# Patient Record
Sex: MALE | Race: WHITE | HISPANIC OR LATINO | Employment: OTHER | URBAN - METROPOLITAN AREA
[De-identification: names, ages, dates, MRNs, and addresses within clinical notes are randomized per-mention and may not be internally consistent; named-entity substitution may affect disease eponyms.]

---

## 2020-04-28 NOTE — PATIENT DISCUSSION
1.  PVD OS: Patient was cautioned to call our office immediately if they experience a substantial change in their symptoms such as an increase in floaters persistent flashes loss of visual field (may appear as a shadow or a curtain) or decrease in visual acuity as these may indicate a retinal tear or detachment. If this is a new problem patient will need to return for re-examination  as determined by the [address] [facility]. Refractive error - Glasses change optional. 3.  Return for an appointment in 1 year for comprehensive exam. with Dr. Chevy Oconnor.

## 2020-05-15 ENCOUNTER — NEW REFERRAL (OUTPATIENT)
Dept: URBAN - METROPOLITAN AREA CLINIC 26 | Facility: CLINIC | Age: 70
End: 2020-05-15

## 2020-05-15 VITALS
HEART RATE: 80 BPM | BODY MASS INDEX: 35.68 KG/M2 | SYSTOLIC BLOOD PRESSURE: 125 MMHG | HEIGHT: 66 IN | WEIGHT: 222 LBS | DIASTOLIC BLOOD PRESSURE: 78 MMHG

## 2020-05-15 DIAGNOSIS — H35.372: ICD-10-CM

## 2020-05-15 DIAGNOSIS — H43.393: ICD-10-CM

## 2020-05-15 DIAGNOSIS — H31.092: ICD-10-CM

## 2020-05-15 DIAGNOSIS — D31.31: ICD-10-CM

## 2020-05-15 DIAGNOSIS — H35.3131: ICD-10-CM

## 2020-05-15 DIAGNOSIS — H47.092: ICD-10-CM

## 2020-05-15 DIAGNOSIS — H43.813: ICD-10-CM

## 2020-05-15 PROCEDURE — 92250 FUNDUS PHOTOGRAPHY W/I&R: CPT

## 2020-05-15 PROCEDURE — 92004 COMPRE OPH EXAM NEW PT 1/>: CPT

## 2020-05-15 ASSESSMENT — VISUAL ACUITY
OS_CC: 20/30
OS_SC: 20/30
OD_SC: 20/25-2
OD_CC: 20/25

## 2020-05-15 ASSESSMENT — TONOMETRY
OS_IOP_MMHG: 15
OD_IOP_MMHG: 14

## 2020-09-18 ENCOUNTER — FOLLOW UP (OUTPATIENT)
Dept: URBAN - METROPOLITAN AREA CLINIC 26 | Facility: CLINIC | Age: 70
End: 2020-09-18

## 2020-09-18 DIAGNOSIS — D31.31: ICD-10-CM

## 2020-09-18 DIAGNOSIS — H31.092: ICD-10-CM

## 2020-09-18 DIAGNOSIS — H43.393: ICD-10-CM

## 2020-09-18 DIAGNOSIS — H43.813: ICD-10-CM

## 2020-09-18 DIAGNOSIS — H35.372: ICD-10-CM

## 2020-09-18 DIAGNOSIS — H35.3131: ICD-10-CM

## 2020-09-18 DIAGNOSIS — H47.092: ICD-10-CM

## 2020-09-18 PROCEDURE — 92250 FUNDUS PHOTOGRAPHY W/I&R: CPT

## 2020-09-18 PROCEDURE — 99214 OFFICE O/P EST MOD 30 MIN: CPT

## 2020-09-18 ASSESSMENT — VISUAL ACUITY
OD_CC: 20/25+1
OS_PH: 20/30-1
OS_CC: 20/40-2

## 2020-09-18 ASSESSMENT — TONOMETRY
OS_IOP_MMHG: 12
OD_IOP_MMHG: 13

## 2021-03-19 ENCOUNTER — FOLLOW UP (OUTPATIENT)
Dept: URBAN - METROPOLITAN AREA CLINIC 26 | Facility: CLINIC | Age: 71
End: 2021-03-19

## 2021-03-19 VITALS — BODY MASS INDEX: 35.68 KG/M2 | HEIGHT: 66 IN | WEIGHT: 222 LBS

## 2021-03-19 DIAGNOSIS — H43.393: ICD-10-CM

## 2021-03-19 DIAGNOSIS — H47.092: ICD-10-CM

## 2021-03-19 DIAGNOSIS — H43.813: ICD-10-CM

## 2021-03-19 DIAGNOSIS — H35.372: ICD-10-CM

## 2021-03-19 DIAGNOSIS — H31.092: ICD-10-CM

## 2021-03-19 DIAGNOSIS — D31.31: ICD-10-CM

## 2021-03-19 DIAGNOSIS — H35.3131: ICD-10-CM

## 2021-03-19 PROCEDURE — 92250 FUNDUS PHOTOGRAPHY W/I&R: CPT

## 2021-03-19 PROCEDURE — 92014 COMPRE OPH EXAM EST PT 1/>: CPT

## 2021-03-19 PROCEDURE — 92134 CPTRZ OPH DX IMG PST SGM RTA: CPT

## 2021-03-19 ASSESSMENT — TONOMETRY
OS_IOP_MMHG: 12
OD_IOP_MMHG: 10

## 2021-03-19 ASSESSMENT — VISUAL ACUITY
OD_SC: 20/20-2
OS_SC: 20/30
OS_CC: 20/30
OD_CC: 20/50+2

## 2021-04-28 NOTE — PATIENT DISCUSSION
1.  PVD OU:  Patient was cautioned to call our office immediately if they experience a substantial change in their symptoms such as an increase in floaters persistent flashes loss of visual field (may appear as a shadow or a curtain) or decrease in visual acuity as these may indicate a retinal tear or detachment. If this is a new problem patient will need to return for re-examination  as determined by the [address] [facility]. Refractive error - Glasses change optional. 3.  Return for an appointment in 1 year for comprehensive exam. with Dr. Rachel Richter.

## 2021-09-17 ENCOUNTER — FOLLOW UP (OUTPATIENT)
Dept: URBAN - METROPOLITAN AREA CLINIC 26 | Facility: CLINIC | Age: 71
End: 2021-09-17

## 2021-09-17 DIAGNOSIS — H35.372: ICD-10-CM

## 2021-09-17 DIAGNOSIS — H04.123: ICD-10-CM

## 2021-09-17 DIAGNOSIS — H31.092: ICD-10-CM

## 2021-09-17 DIAGNOSIS — D31.31: ICD-10-CM

## 2021-09-17 DIAGNOSIS — H43.393: ICD-10-CM

## 2021-09-17 DIAGNOSIS — H47.092: ICD-10-CM

## 2021-09-17 DIAGNOSIS — H35.3131: ICD-10-CM

## 2021-09-17 DIAGNOSIS — H33.102: ICD-10-CM

## 2021-09-17 DIAGNOSIS — H43.813: ICD-10-CM

## 2021-09-17 PROCEDURE — 92014 COMPRE OPH EXAM EST PT 1/>: CPT

## 2021-09-17 PROCEDURE — 92134 CPTRZ OPH DX IMG PST SGM RTA: CPT

## 2021-09-17 PROCEDURE — 67105 REPAIR DETACHED RETINA PC: CPT

## 2021-09-17 ASSESSMENT — VISUAL ACUITY
OS_CC: 20/30-2
OD_CC: 20/25

## 2021-09-17 ASSESSMENT — TONOMETRY
OS_IOP_MMHG: 14
OD_IOP_MMHG: 13

## 2021-09-27 ENCOUNTER — OFFICE VISIT (OUTPATIENT)
Dept: URBAN - METROPOLITAN AREA CLINIC 121 | Facility: CLINIC | Age: 71
End: 2021-09-27

## 2021-10-11 ENCOUNTER — OFFICE VISIT (OUTPATIENT)
Dept: URBAN - METROPOLITAN AREA CLINIC 63 | Facility: CLINIC | Age: 71
End: 2021-10-11

## 2021-10-18 ENCOUNTER — FOLLOW UP (OUTPATIENT)
Dept: URBAN - METROPOLITAN AREA CLINIC 26 | Facility: CLINIC | Age: 71
End: 2021-10-18

## 2021-10-18 DIAGNOSIS — H43.393: ICD-10-CM

## 2021-10-18 DIAGNOSIS — H47.092: ICD-10-CM

## 2021-10-18 DIAGNOSIS — H43.813: ICD-10-CM

## 2021-10-18 DIAGNOSIS — H35.372: ICD-10-CM

## 2021-10-18 DIAGNOSIS — H31.092: ICD-10-CM

## 2021-10-18 DIAGNOSIS — D31.31: ICD-10-CM

## 2021-10-18 DIAGNOSIS — H04.123: ICD-10-CM

## 2021-10-18 DIAGNOSIS — H35.3131: ICD-10-CM

## 2021-10-18 DIAGNOSIS — H33.102: ICD-10-CM

## 2021-10-18 PROCEDURE — 92250 FUNDUS PHOTOGRAPHY W/I&R: CPT

## 2021-10-18 PROCEDURE — 92012 INTRM OPH EXAM EST PATIENT: CPT

## 2021-10-18 ASSESSMENT — TONOMETRY
OS_IOP_MMHG: 13
OD_IOP_MMHG: 13

## 2021-10-18 ASSESSMENT — VISUAL ACUITY
OS_CC: 20/25-2
OD_CC: 20/25

## 2021-12-16 ENCOUNTER — OFFICE VISIT (OUTPATIENT)
Dept: URBAN - METROPOLITAN AREA SURGERY CENTER 4 | Facility: SURGERY CENTER | Age: 71
End: 2021-12-16

## 2021-12-23 LAB — PATHOLOGY (INDENTED REPORT): (no result)

## 2022-01-12 ENCOUNTER — OFFICE VISIT (OUTPATIENT)
Dept: URBAN - METROPOLITAN AREA CLINIC 63 | Facility: CLINIC | Age: 72
End: 2022-01-12

## 2022-04-14 NOTE — PATIENT DISCUSSION
1.  PVD OU:  Patient was cautioned to call our office immediately if they experience a substantial change in their symptoms such as an increase in floaters persistent flashes loss of visual field (may appear as a shadow or a curtain) or decrease in visual acuity as these may indicate a retinal tear or detachment. If this is a new problem patient will need to return for re-examination  as determined by the [address] [facility]. Refractive error - Glasses change optional. 3.  Return for an appointment in 1 year for comprehensive exam. with Dr. Terry Lopez.

## 2022-04-18 ENCOUNTER — FOLLOW UP (OUTPATIENT)
Dept: URBAN - METROPOLITAN AREA CLINIC 26 | Facility: CLINIC | Age: 72
End: 2022-04-18

## 2022-04-18 DIAGNOSIS — H31.092: ICD-10-CM

## 2022-04-18 DIAGNOSIS — H33.102: ICD-10-CM

## 2022-04-18 DIAGNOSIS — D31.31: ICD-10-CM

## 2022-04-18 DIAGNOSIS — H04.123: ICD-10-CM

## 2022-04-18 DIAGNOSIS — H35.372: ICD-10-CM

## 2022-04-18 DIAGNOSIS — H43.813: ICD-10-CM

## 2022-04-18 DIAGNOSIS — H43.393: ICD-10-CM

## 2022-04-18 DIAGNOSIS — H35.3131: ICD-10-CM

## 2022-04-18 DIAGNOSIS — H47.092: ICD-10-CM

## 2022-04-18 PROCEDURE — 92014 COMPRE OPH EXAM EST PT 1/>: CPT

## 2022-04-18 PROCEDURE — 92134 CPTRZ OPH DX IMG PST SGM RTA: CPT

## 2022-04-18 ASSESSMENT — VISUAL ACUITY
OD_PH: 20/25-2
OS_SC: 20/40
OD_SC: 20/40

## 2022-04-18 ASSESSMENT — TONOMETRY
OS_IOP_MMHG: 10
OD_IOP_MMHG: 11

## 2022-07-09 ENCOUNTER — TELEPHONE ENCOUNTER (OUTPATIENT)
Dept: URBAN - METROPOLITAN AREA CLINIC 121 | Facility: CLINIC | Age: 72
End: 2022-07-09

## 2022-07-09 RX ORDER — DEXLANSOPRAZOLE 60 MG/1
CAPSULE, DELAYED RELEASE ORAL ONCE A DAY
Refills: 11 | OUTPATIENT
Start: 2011-03-09 | End: 2011-07-15

## 2022-07-09 RX ORDER — SACCHAROMYCES BOULARDII 250 MG
ONCE A DAY CAPSULE ORAL ONCE A DAY
Refills: 0 | OUTPATIENT
Start: 2015-06-29 | End: 2015-10-13

## 2022-07-09 RX ORDER — FUROSEMIDE 40 MG/1
TABLET ORAL ONCE A DAY
Refills: 0 | OUTPATIENT
Start: 2021-10-11 | End: 2022-01-12

## 2022-07-09 RX ORDER — ALBUTEROL SULFATE 0.63 MG/3ML
SOLUTION RESPIRATORY (INHALATION) ONCE A DAY
Refills: 0 | OUTPATIENT
Start: 2021-10-11 | End: 2022-01-12

## 2022-07-09 RX ORDER — ALBUTEROL SULFATE 0.63 MG/3ML
SOLUTION RESPIRATORY (INHALATION) ONCE A DAY
Refills: 0 | OUTPATIENT
Start: 2016-04-13 | End: 2016-06-08

## 2022-07-09 RX ORDER — FUROSEMIDE 40 MG/1
TABLET ORAL ONCE A DAY
Refills: 0 | OUTPATIENT
Start: 2017-08-02 | End: 2017-11-07

## 2022-07-09 RX ORDER — IPRATROPIUM BROMIDE 0.5 MG/2.5ML
SOLUTION RESPIRATORY (INHALATION) ONCE A DAY
Refills: 0 | OUTPATIENT
Start: 2016-04-13 | End: 2016-06-08

## 2022-07-09 RX ORDER — TESTOSTERONE CYPIONATE 100 MG/ML
INJECTION, SOLUTION INTRAMUSCULAR ONCE A DAY
Refills: 0 | OUTPATIENT
Start: 2016-06-08 | End: 2017-03-28

## 2022-07-09 RX ORDER — TIOTROPIUM BROMIDE 18 UG/1
CAPSULE ORAL; RESPIRATORY (INHALATION) ONCE A DAY
Refills: 0 | OUTPATIENT
Start: 2018-01-17 | End: 2018-05-09

## 2022-07-09 RX ORDER — OMEG3/DHA/EPA/FISH OIL/L.CASEI 120-400MG
ONCE A DAY CAPSULE ORAL ONCE A DAY
Refills: 1 | OUTPATIENT
Start: 2017-03-28 | End: 2017-05-24

## 2022-07-09 RX ORDER — TESTOSTERONE CYPIONATE 100 MG/ML
INJECTION, SOLUTION INTRAMUSCULAR ONCE A DAY
Refills: 0 | OUTPATIENT
Start: 2017-03-28 | End: 2017-05-24

## 2022-07-09 RX ORDER — FAMOTIDINE 10 MG
TABLET ORAL
Refills: 0 | OUTPATIENT
Start: 2018-01-17 | End: 2018-05-09

## 2022-07-09 RX ORDER — HYDROCORTISONE 2.5% 25 MG/G
CREAM TOPICAL ONCE A DAY
Refills: 0 | OUTPATIENT
Start: 2012-06-05 | End: 2012-07-05

## 2022-07-09 RX ORDER — KETOCONAZOLE 20 MG/G
CREAM TOPICAL ONCE A DAY
Refills: 0 | OUTPATIENT
Start: 2018-09-25 | End: 2021-10-11

## 2022-07-09 RX ORDER — METOPROLOL SUCCINATE 25 MG/1
TABLET, EXTENDED RELEASE ORAL ONCE A DAY
Refills: 0 | OUTPATIENT
Start: 2021-10-11 | End: 2022-01-12

## 2022-07-09 RX ORDER — ALBUTEROL SULFATE 0.63 MG/3ML
SOLUTION RESPIRATORY (INHALATION) ONCE A DAY
Refills: 0 | OUTPATIENT
Start: 2018-05-09 | End: 2018-09-25

## 2022-07-09 RX ORDER — IPRATROPIUM BROMIDE 0.5 MG/2.5ML
SOLUTION RESPIRATORY (INHALATION) ONCE A DAY
Refills: 0 | OUTPATIENT
Start: 2021-10-11 | End: 2022-01-12

## 2022-07-09 RX ORDER — NITROGLYCERIN 0.4 MG/1
TABLET SUBLINGUAL ONCE A DAY
Refills: 0 | OUTPATIENT
Start: 2018-05-09 | End: 2018-09-25

## 2022-07-09 RX ORDER — DEXLANSOPRAZOLE 60 MG/1
CAPSULE, DELAYED RELEASE ORAL
Refills: 0 | OUTPATIENT
Start: 2017-05-24 | End: 2017-08-02

## 2022-07-09 RX ORDER — KETOCONAZOLE 20 MG/G
CREAM TOPICAL
Refills: 0 | OUTPATIENT
Start: 2014-01-07 | End: 2015-10-13

## 2022-07-09 RX ORDER — ONDANSETRON HYDROCHLORIDE 4 MG/2ML
PRN INJECTION, SOLUTION INTRAMUSCULAR; INTRAVENOUS
Refills: 0 | OUTPATIENT
Start: 2017-03-28 | End: 2017-05-24

## 2022-07-09 RX ORDER — NITROGLYCERIN 0.4 MG/1
TABLET SUBLINGUAL ONCE A DAY
Refills: 0 | OUTPATIENT
Start: 2017-08-02 | End: 2017-11-07

## 2022-07-09 RX ORDER — KETOCONAZOLE 20 MG/G
CREAM TOPICAL ONCE A DAY
Refills: 0 | OUTPATIENT
Start: 2017-03-28 | End: 2017-05-24

## 2022-07-09 RX ORDER — ISOSORBIDE MONONITRATE 30 MG/1
TABLET, EXTENDED RELEASE ORAL ONCE A DAY
Refills: 0 | OUTPATIENT
Start: 2018-01-17 | End: 2018-05-09

## 2022-07-09 RX ORDER — ISOSORBIDE MONONITRATE 30 MG/1
TABLET, EXTENDED RELEASE ORAL ONCE A DAY
Refills: 0 | OUTPATIENT
Start: 2017-08-02 | End: 2017-11-07

## 2022-07-09 RX ORDER — METOPROLOL SUCCINATE 25 MG/1
TABLET, EXTENDED RELEASE ORAL ONCE A DAY
Refills: 0 | OUTPATIENT
Start: 2017-05-24 | End: 2017-08-02

## 2022-07-09 RX ORDER — CHOLESTYRAMINE 4 G/9G
POWDER, FOR SUSPENSION ORAL ONCE A DAY
Refills: 1 | OUTPATIENT
Start: 2011-12-16 | End: 2012-02-15

## 2022-07-09 RX ORDER — FAMOTIDINE 10 MG
TABLET ORAL
Refills: 0 | OUTPATIENT
Start: 2017-05-24 | End: 2017-08-02

## 2022-07-09 RX ORDER — DEXLANSOPRAZOLE 60 MG/1
CAPSULE, DELAYED RELEASE ORAL ONCE A DAY
Refills: 3 | OUTPATIENT
Start: 2015-02-10 | End: 2015-07-10

## 2022-07-09 RX ORDER — NITROGLYCERIN 0.4 MG/1
TABLET SUBLINGUAL ONCE A DAY
Refills: 0 | OUTPATIENT
Start: 2021-10-11 | End: 2022-01-12

## 2022-07-09 RX ORDER — ALBUTEROL SULFATE 0.63 MG/3ML
SOLUTION RESPIRATORY (INHALATION)
Refills: 0 | OUTPATIENT
Start: 2011-09-08 | End: 2014-01-07

## 2022-07-09 RX ORDER — METOPROLOL SUCCINATE 25 MG/1
TABLET, EXTENDED RELEASE ORAL ONCE A DAY
Refills: 0 | OUTPATIENT
Start: 2018-09-25 | End: 2021-10-11

## 2022-07-09 RX ORDER — KETOCONAZOLE 20 MG/G
CREAM TOPICAL ONCE A DAY
Refills: 0 | OUTPATIENT
Start: 2017-05-24 | End: 2017-08-02

## 2022-07-09 RX ORDER — METOPROLOL SUCCINATE 25 MG/1
TABLET, EXTENDED RELEASE ORAL ONCE A DAY
Refills: 0 | OUTPATIENT
Start: 2018-01-17 | End: 2018-05-09

## 2022-07-09 RX ORDER — METOPROLOL SUCCINATE 25 MG/1
TABLET, EXTENDED RELEASE ORAL
Refills: 0 | OUTPATIENT
Start: 2015-10-13 | End: 2016-04-13

## 2022-07-09 RX ORDER — ONDANSETRON HYDROCHLORIDE 4 MG/2ML
USE AS DIRECTED INJECTION, SOLUTION INTRAMUSCULAR; INTRAVENOUS
Refills: 0 | OUTPATIENT
Start: 2015-03-27 | End: 2015-06-29

## 2022-07-09 RX ORDER — TESTOSTERONE CYPIONATE 100 MG/ML
INJECTION, SOLUTION INTRAMUSCULAR ONCE A DAY
Refills: 0 | OUTPATIENT
Start: 2017-05-24 | End: 2017-08-02

## 2022-07-09 RX ORDER — NITROGLYCERIN 0.4 MG/1
TABLET SUBLINGUAL ONCE A DAY
Refills: 0 | OUTPATIENT
Start: 2016-04-13 | End: 2016-06-08

## 2022-07-09 RX ORDER — ISOSORBIDE MONONITRATE 30 MG/1
TABLET, EXTENDED RELEASE ORAL
Refills: 0 | OUTPATIENT
Start: 2011-09-08 | End: 2014-01-07

## 2022-07-09 RX ORDER — DEXLANSOPRAZOLE 60 MG/1
CAPSULE, DELAYED RELEASE ORAL
Refills: 0 | OUTPATIENT
Start: 2018-01-17 | End: 2018-05-09

## 2022-07-09 RX ORDER — METOPROLOL TARTRATE 25 MG/1
TABLET, FILM COATED ORAL
Refills: 0 | OUTPATIENT
Start: 2010-01-04 | End: 2010-12-17

## 2022-07-09 RX ORDER — ALBUTEROL SULFATE 0.63 MG/3ML
SOLUTION RESPIRATORY (INHALATION) ONCE A DAY
Refills: 0 | OUTPATIENT
Start: 2017-05-24 | End: 2017-08-02

## 2022-07-09 RX ORDER — CHOLESTYRAMINE 4 G/9G
POWDER, FOR SUSPENSION ORAL ONCE A DAY
Refills: 1 | OUTPATIENT
Start: 2011-12-01 | End: 2011-12-16

## 2022-07-09 RX ORDER — AMLODIPINE BESYLATE 5 MG/1
TABLET ORAL ONCE A DAY
Refills: 0 | OUTPATIENT
Start: 2017-11-07 | End: 2018-01-17

## 2022-07-09 RX ORDER — ONDANSETRON HYDROCHLORIDE 4 MG/2ML
PRN INJECTION, SOLUTION INTRAMUSCULAR; INTRAVENOUS
Refills: 0 | OUTPATIENT
Start: 2017-11-07 | End: 2018-01-17

## 2022-07-09 RX ORDER — NITROGLYCERIN 0.4 MG/1
TABLET SUBLINGUAL
Refills: 0 | OUTPATIENT
Start: 2011-09-08 | End: 2014-01-07

## 2022-07-09 RX ORDER — IPRATROPIUM BROMIDE 0.5 MG/2.5ML
SOLUTION RESPIRATORY (INHALATION) ONCE A DAY
Refills: 0 | OUTPATIENT
Start: 2018-09-25 | End: 2021-10-11

## 2022-07-09 RX ORDER — ISOSORBIDE MONONITRATE 30 MG/1
TABLET, EXTENDED RELEASE ORAL ONCE A DAY
Refills: 0 | OUTPATIENT
Start: 2017-03-28 | End: 2017-05-24

## 2022-07-09 RX ORDER — KETOCONAZOLE 20 MG/G
CREAM TOPICAL
Refills: 0 | OUTPATIENT
Start: 2015-10-13 | End: 2016-04-13

## 2022-07-09 RX ORDER — METOPROLOL SUCCINATE 25 MG/1
TABLET, EXTENDED RELEASE ORAL ONCE A DAY
Refills: 0 | OUTPATIENT
Start: 2017-08-02 | End: 2017-11-07

## 2022-07-09 RX ORDER — METOPROLOL SUCCINATE 25 MG/1
TABLET, EXTENDED RELEASE ORAL ONCE A DAY
Refills: 0 | OUTPATIENT
Start: 2016-04-13 | End: 2016-06-08

## 2022-07-09 RX ORDER — AMLODIPINE BESYLATE 5 MG/1
TABLET ORAL ONCE A DAY
Refills: 0 | OUTPATIENT
Start: 2018-01-17 | End: 2018-05-09

## 2022-07-09 RX ORDER — METOPROLOL SUCCINATE 25 MG/1
TABLET, EXTENDED RELEASE ORAL ONCE A DAY
Refills: 0 | OUTPATIENT
Start: 2017-11-07 | End: 2018-01-17

## 2022-07-09 RX ORDER — FUROSEMIDE 40 MG/1
TABLET ORAL ONCE A DAY
Refills: 0 | OUTPATIENT
Start: 2016-04-13 | End: 2016-06-08

## 2022-07-09 RX ORDER — DEXLANSOPRAZOLE 60 MG/1
CAPSULE, DELAYED RELEASE ORAL
Refills: 0 | OUTPATIENT
Start: 2018-05-09 | End: 2018-09-25

## 2022-07-09 RX ORDER — IPRATROPIUM BROMIDE 0.5 MG/2.5ML
SOLUTION RESPIRATORY (INHALATION) ONCE A DAY
Refills: 0 | OUTPATIENT
Start: 2017-03-28 | End: 2017-05-24

## 2022-07-09 RX ORDER — PANCRELIPASE 24000; 76000; 120000 [USP'U]/1; [USP'U]/1; [USP'U]/1
THREE TIMES A DAY CAPSULE, DELAYED RELEASE PELLETS ORAL THREE TIMES A DAY
Refills: 3 | OUTPATIENT
Start: 2018-05-09 | End: 2018-08-02

## 2022-07-09 RX ORDER — MONTELUKAST SODIUM 10 MG/1
TABLET, FILM COATED ORAL ONCE A DAY
Refills: 0 | OUTPATIENT
Start: 2016-06-08 | End: 2017-03-28

## 2022-07-09 RX ORDER — FUROSEMIDE 40 MG/1
TABLET ORAL ONCE A DAY
Refills: 0 | OUTPATIENT
Start: 2018-05-09 | End: 2018-09-25

## 2022-07-09 RX ORDER — NITROGLYCERIN 0.4 MG/1
TABLET SUBLINGUAL ONCE A DAY
Refills: 0 | OUTPATIENT
Start: 2017-03-28 | End: 2017-05-24

## 2022-07-09 RX ORDER — AMLODIPINE BESYLATE 5 MG/1
TABLET ORAL ONCE A DAY
Refills: 0 | OUTPATIENT
Start: 2021-10-11 | End: 2022-01-12

## 2022-07-09 RX ORDER — AMLODIPINE BESYLATE 5 MG/1
TABLET ORAL ONCE A DAY
Refills: 0 | OUTPATIENT
Start: 2017-05-24 | End: 2017-08-02

## 2022-07-09 RX ORDER — IPRATROPIUM BROMIDE 0.5 MG/2.5ML
SOLUTION RESPIRATORY (INHALATION) ONCE A DAY
Refills: 0 | OUTPATIENT
Start: 2018-01-17 | End: 2018-05-09

## 2022-07-09 RX ORDER — ISOSORBIDE MONONITRATE 30 MG/1
TABLET, EXTENDED RELEASE ORAL
Refills: 0 | OUTPATIENT
Start: 2014-01-07 | End: 2015-10-13

## 2022-07-09 RX ORDER — FUROSEMIDE 40 MG/1
TABLET ORAL
Refills: 0 | OUTPATIENT
Start: 2015-10-13 | End: 2016-04-13

## 2022-07-09 RX ORDER — DEXLANSOPRAZOLE 60 MG/1
ONCE A DAY CAPSULE, DELAYED RELEASE ORAL ONCE A DAY
Refills: 3 | OUTPATIENT
Start: 2015-07-10 | End: 2015-10-13

## 2022-07-09 RX ORDER — TESTOSTERONE CYPIONATE 100 MG/ML
INJECTION, SOLUTION INTRAMUSCULAR ONCE A DAY
Refills: 0 | OUTPATIENT
Start: 2017-08-02 | End: 2017-11-07

## 2022-07-09 RX ORDER — DEXLANSOPRAZOLE 60 MG/1
CAPSULE, DELAYED RELEASE ORAL ONCE A DAY
Refills: 3 | OUTPATIENT
Start: 2011-07-15 | End: 2014-01-29

## 2022-07-09 RX ORDER — METOPROLOL SUCCINATE 25 MG/1
TABLET, EXTENDED RELEASE ORAL ONCE A DAY
Refills: 0 | OUTPATIENT
Start: 2018-05-09 | End: 2018-09-25

## 2022-07-09 RX ORDER — MONTELUKAST SODIUM 10 MG/1
TABLET, FILM COATED ORAL ONCE A DAY
Refills: 0 | OUTPATIENT
Start: 2017-05-24 | End: 2017-08-02

## 2022-07-09 RX ORDER — ONDANSETRON HYDROCHLORIDE 4 MG/2ML
PRN INJECTION, SOLUTION INTRAMUSCULAR; INTRAVENOUS
Refills: 0 | OUTPATIENT
Start: 2018-09-25 | End: 2021-10-11

## 2022-07-09 RX ORDER — HYDROCORTISONE 2.5% 25 MG/G
CREAM TOPICAL ONCE A DAY
Refills: 6 | OUTPATIENT
Start: 2011-11-16 | End: 2012-06-05

## 2022-07-09 RX ORDER — PANCRELIPASE 24000; 76000; 120000 [USP'U]/1; [USP'U]/1; [USP'U]/1
THREE TIMES A DAY CAPSULE, DELAYED RELEASE PELLETS ORAL THREE TIMES A DAY
Refills: 0 | OUTPATIENT
Start: 2018-09-25 | End: 2020-01-15

## 2022-07-09 RX ORDER — FUROSEMIDE 40 MG/1
TABLET ORAL ONCE A DAY
Refills: 0 | OUTPATIENT
Start: 2017-05-24 | End: 2017-08-02

## 2022-07-09 RX ORDER — MESALAMINE 800 MG/1
TID TABLET, DELAYED RELEASE ORAL
Refills: 0 | OUTPATIENT
Start: 2011-09-08 | End: 2011-09-12

## 2022-07-09 RX ORDER — IPRATROPIUM BROMIDE 0.5 MG/2.5ML
SOLUTION RESPIRATORY (INHALATION) ONCE A DAY
Refills: 0 | OUTPATIENT
Start: 2017-05-24 | End: 2017-08-02

## 2022-07-09 RX ORDER — FAMOTIDINE 10 MG
TABLET ORAL
Refills: 0 | OUTPATIENT
Start: 2018-05-09 | End: 2018-09-25

## 2022-07-09 RX ORDER — DEXLANSOPRAZOLE 60 MG/1
ONCE A DAY CAPSULE, DELAYED RELEASE ORAL ONCE A DAY
Refills: 3 | OUTPATIENT
Start: 2017-08-02 | End: 2018-01-17

## 2022-07-09 RX ORDER — TESTOSTERONE CYPIONATE 100 MG/ML
INJECTION, SOLUTION INTRAMUSCULAR ONCE A DAY
Refills: 0 | OUTPATIENT
Start: 2018-01-17 | End: 2018-05-09

## 2022-07-09 RX ORDER — TIOTROPIUM BROMIDE 18 UG/1
CAPSULE ORAL; RESPIRATORY (INHALATION) ONCE A DAY
Refills: 0 | OUTPATIENT
Start: 2016-06-08 | End: 2017-03-28

## 2022-07-09 RX ORDER — ALBUTEROL SULFATE 0.63 MG/3ML
SOLUTION RESPIRATORY (INHALATION)
Refills: 0 | OUTPATIENT
Start: 2015-10-13 | End: 2016-04-13

## 2022-07-09 RX ORDER — FUROSEMIDE 40 MG/1
TABLET ORAL ONCE A DAY
Refills: 0 | OUTPATIENT
Start: 2017-11-07 | End: 2018-01-17

## 2022-07-09 RX ORDER — IPRATROPIUM BROMIDE 0.5 MG/2.5ML
SOLUTION RESPIRATORY (INHALATION)
Refills: 0 | OUTPATIENT
Start: 2014-01-07 | End: 2015-10-13

## 2022-07-09 RX ORDER — PANCRELIPASE 24000; 76000; 120000 [USP'U]/1; [USP'U]/1; [USP'U]/1
TAKE ONE CAPSULE BY MOUTH THREE TIMES A DAY CAPSULE, DELAYED RELEASE PELLETS ORAL
Refills: 3 | OUTPATIENT
Start: 2018-08-02 | End: 2018-09-25

## 2022-07-09 RX ORDER — IPRATROPIUM BROMIDE 0.5 MG/2.5ML
SOLUTION RESPIRATORY (INHALATION)
Refills: 0 | OUTPATIENT
Start: 2015-10-13 | End: 2016-04-13

## 2022-07-09 RX ORDER — MONTELUKAST SODIUM 10 MG/1
TABLET, FILM COATED ORAL
Refills: 0 | OUTPATIENT
Start: 2010-01-04 | End: 2010-12-17

## 2022-07-09 RX ORDER — SUCRALFATE 1 G/1
TABLET ORAL
Refills: 0 | OUTPATIENT
Start: 2017-08-02 | End: 2017-11-07

## 2022-07-09 RX ORDER — ALBUTEROL SULFATE 0.63 MG/3ML
SOLUTION RESPIRATORY (INHALATION)
Refills: 0 | OUTPATIENT
Start: 2014-01-07 | End: 2015-10-13

## 2022-07-09 RX ORDER — MONTELUKAST SODIUM 10 MG/1
TABLET, FILM COATED ORAL ONCE A DAY
Refills: 0 | OUTPATIENT
Start: 2017-11-07 | End: 2018-01-17

## 2022-07-09 RX ORDER — TIOTROPIUM BROMIDE 18 UG/1
CAPSULE ORAL; RESPIRATORY (INHALATION) ONCE A DAY
Refills: 0 | OUTPATIENT
Start: 2021-10-11 | End: 2022-01-12

## 2022-07-09 RX ORDER — TIOTROPIUM BROMIDE 18 UG/1
CAPSULE ORAL; RESPIRATORY (INHALATION) ONCE A DAY
Refills: 0 | OUTPATIENT
Start: 2018-09-25 | End: 2021-10-11

## 2022-07-09 RX ORDER — AMLODIPINE BESYLATE 5 MG/1
TABLET ORAL ONCE A DAY
Refills: 0 | OUTPATIENT
Start: 2016-06-08 | End: 2017-03-28

## 2022-07-09 RX ORDER — MONTELUKAST SODIUM 10 MG/1
TABLET, FILM COATED ORAL
Refills: 0 | OUTPATIENT
Start: 2014-01-07 | End: 2015-10-13

## 2022-07-09 RX ORDER — MONTELUKAST SODIUM 10 MG/1
TABLET, FILM COATED ORAL ONCE A DAY
Refills: 0 | OUTPATIENT
Start: 2018-01-17 | End: 2018-05-09

## 2022-07-09 RX ORDER — FUROSEMIDE 40 MG/1
TABLET ORAL ONCE A DAY
Refills: 0 | OUTPATIENT
Start: 2017-03-28 | End: 2017-05-24

## 2022-07-09 RX ORDER — KETOCONAZOLE 20 MG/G
CREAM TOPICAL ONCE A DAY
Refills: 0 | OUTPATIENT
Start: 2017-11-07 | End: 2018-01-17

## 2022-07-09 RX ORDER — ISOSORBIDE MONONITRATE 30 MG/1
TABLET, EXTENDED RELEASE ORAL ONCE A DAY
Refills: 0 | OUTPATIENT
Start: 2016-04-13 | End: 2016-06-08

## 2022-07-09 RX ORDER — MONTELUKAST SODIUM 10 MG/1
TABLET, FILM COATED ORAL ONCE A DAY
Refills: 0 | OUTPATIENT
Start: 2021-10-11 | End: 2022-01-12

## 2022-07-09 RX ORDER — KETOCONAZOLE 20 MG/G
CREAM TOPICAL
Refills: 0 | OUTPATIENT
Start: 2010-01-04 | End: 2010-12-17

## 2022-07-09 RX ORDER — KETOCONAZOLE 20 MG/G
CREAM TOPICAL
Refills: 0 | OUTPATIENT
Start: 2011-09-08 | End: 2014-01-07

## 2022-07-09 RX ORDER — AMLODIPINE BESYLATE 5 MG/1
TABLET ORAL ONCE A DAY
Refills: 0 | OUTPATIENT
Start: 2018-09-25 | End: 2021-10-11

## 2022-07-09 RX ORDER — METOPROLOL SUCCINATE 25 MG/1
TABLET, EXTENDED RELEASE ORAL
Refills: 0 | OUTPATIENT
Start: 2014-01-07 | End: 2015-10-13

## 2022-07-09 RX ORDER — DEXLANSOPRAZOLE 60 MG/1
ONCE A DAY CAPSULE, DELAYED RELEASE ORAL ONCE A DAY
Refills: 3 | OUTPATIENT
Start: 2015-10-13 | End: 2016-04-13

## 2022-07-09 RX ORDER — PANCRELIPASE 24000; 76000; 120000 [USP'U]/1; [USP'U]/1; [USP'U]/1
CAPSULE, DELAYED RELEASE PELLETS ORAL THREE TIMES A DAY
Refills: 0 | OUTPATIENT
Start: 2018-09-25 | End: 2021-10-11

## 2022-07-09 RX ORDER — DEXLANSOPRAZOLE 60 MG/1
CAPSULE, DELAYED RELEASE ORAL
Refills: 0 | OUTPATIENT
Start: 2018-09-25 | End: 2021-10-11

## 2022-07-09 RX ORDER — DEXLANSOPRAZOLE 60 MG/1
CAPSULE, DELAYED RELEASE ORAL
Refills: 0 | OUTPATIENT
Start: 2017-08-02 | End: 2017-08-02

## 2022-07-09 RX ORDER — FAMOTIDINE 10 MG
TABLET ORAL
Refills: 0 | OUTPATIENT
Start: 2017-08-02 | End: 2017-11-07

## 2022-07-09 RX ORDER — DEXLANSOPRAZOLE 60 MG/1
ONCE A DAY CAPSULE, DELAYED RELEASE ORAL ONCE A DAY
Refills: 3 | OUTPATIENT
Start: 2016-04-13 | End: 2016-06-08

## 2022-07-09 RX ORDER — FAMOTIDINE 10 MG
TABLET ORAL
Refills: 0 | OUTPATIENT
Start: 2021-10-11 | End: 2022-01-12

## 2022-07-09 RX ORDER — FAMOTIDINE 10 MG
TABLET ORAL
Refills: 0 | OUTPATIENT
Start: 2017-11-07 | End: 2018-01-17

## 2022-07-09 RX ORDER — ALBUTEROL SULFATE 0.63 MG/3ML
SOLUTION RESPIRATORY (INHALATION) ONCE A DAY
Refills: 0 | OUTPATIENT
Start: 2018-01-17 | End: 2018-05-09

## 2022-07-09 RX ORDER — ISOSORBIDE MONONITRATE 30 MG/1
TABLET, EXTENDED RELEASE ORAL ONCE A DAY
Refills: 0 | OUTPATIENT
Start: 2017-11-07 | End: 2018-01-17

## 2022-07-09 RX ORDER — ISOSORBIDE MONONITRATE 30 MG/1
TABLET, EXTENDED RELEASE ORAL
Refills: 0 | OUTPATIENT
Start: 2010-01-04 | End: 2010-12-17

## 2022-07-09 RX ORDER — NITROGLYCERIN 0.4 MG/1
TABLET SUBLINGUAL
Refills: 0 | OUTPATIENT
Start: 2014-01-07 | End: 2015-10-13

## 2022-07-09 RX ORDER — SUCRALFATE 1 G/1
TWICE A DAY TABLET ORAL TWICE A DAY
Refills: 0 | OUTPATIENT
Start: 2017-07-14 | End: 2017-08-02

## 2022-07-09 RX ORDER — ONDANSETRON HYDROCHLORIDE 4 MG/2ML
PRN INJECTION, SOLUTION INTRAMUSCULAR; INTRAVENOUS AS NEEDED
Refills: 0 | OUTPATIENT
Start: 2021-10-11 | End: 2022-01-12

## 2022-07-09 RX ORDER — NITROGLYCERIN 0.4 MG/1
TABLET SUBLINGUAL ONCE A DAY
Refills: 0 | OUTPATIENT
Start: 2016-04-13 | End: 2016-04-13

## 2022-07-09 RX ORDER — ONDANSETRON HYDROCHLORIDE 4 MG/2ML
USE AS DIRECTED INJECTION, SOLUTION INTRAMUSCULAR; INTRAVENOUS
Refills: 0 | OUTPATIENT
Start: 2015-06-29 | End: 2017-03-28

## 2022-07-09 RX ORDER — AMLODIPINE BESYLATE 5 MG/1
TABLET ORAL ONCE A DAY
Refills: 0 | OUTPATIENT
Start: 2018-05-09 | End: 2018-09-25

## 2022-07-09 RX ORDER — METOPROLOL SUCCINATE 25 MG/1
TABLET, EXTENDED RELEASE ORAL ONCE A DAY
Refills: 0 | OUTPATIENT
Start: 2016-06-08 | End: 2017-03-28

## 2022-07-09 RX ORDER — TIOTROPIUM BROMIDE 18 UG/1
CAPSULE ORAL; RESPIRATORY (INHALATION)
Refills: 0 | OUTPATIENT
Start: 2014-01-07 | End: 2015-10-13

## 2022-07-09 RX ORDER — AMLODIPINE BESYLATE 5 MG/1
TABLET ORAL ONCE A DAY
Refills: 0 | OUTPATIENT
Start: 2017-08-02 | End: 2017-11-07

## 2022-07-09 RX ORDER — OMEPRAZOLE 40 MG/1
TOMA UNA TABLETA DOS VECES AL DIA UNA HORA ANTES DE COMER CAPSULE, DELAYED RELEASE ORAL
Refills: 3 | OUTPATIENT
Start: 2009-07-02 | End: 2009-09-03

## 2022-07-09 RX ORDER — ALBUTEROL SULFATE 0.63 MG/3ML
SOLUTION RESPIRATORY (INHALATION) ONCE A DAY
Refills: 0 | OUTPATIENT
Start: 2017-08-02 | End: 2017-11-07

## 2022-07-09 RX ORDER — TIOTROPIUM BROMIDE 18 UG/1
CAPSULE ORAL; RESPIRATORY (INHALATION) ONCE A DAY
Refills: 0 | OUTPATIENT
Start: 2017-05-24 | End: 2017-08-02

## 2022-07-09 RX ORDER — ALBUTEROL SULFATE 0.63 MG/3ML
SOLUTION RESPIRATORY (INHALATION) ONCE A DAY
Refills: 0 | OUTPATIENT
Start: 2017-11-07 | End: 2018-01-17

## 2022-07-09 RX ORDER — KETOCONAZOLE 20 MG/G
CREAM TOPICAL ONCE A DAY
Refills: 0 | OUTPATIENT
Start: 2016-06-08 | End: 2017-03-28

## 2022-07-09 RX ORDER — TESTOSTERONE CYPIONATE 100 MG/ML
INJECTION, SOLUTION INTRAMUSCULAR ONCE A DAY
Refills: 0 | OUTPATIENT
Start: 2021-10-11 | End: 2022-01-12

## 2022-07-09 RX ORDER — TIOTROPIUM BROMIDE 18 UG/1
CAPSULE ORAL; RESPIRATORY (INHALATION) ONCE A DAY
Refills: 0 | OUTPATIENT
Start: 2018-05-09 | End: 2018-09-25

## 2022-07-09 RX ORDER — ALBUTEROL SULFATE 2.5 MG/3ML
SOLUTION RESPIRATORY (INHALATION)
Refills: 0 | OUTPATIENT
Start: 2010-01-04 | End: 2010-12-17

## 2022-07-09 RX ORDER — IPRATROPIUM BROMIDE AND ALBUTEROL SULFATE 2.5; .5 MG/3ML; MG/3ML
SOLUTION RESPIRATORY (INHALATION)
Refills: 0 | OUTPATIENT
Start: 2010-01-04 | End: 2010-12-17

## 2022-07-09 RX ORDER — ONDANSETRON HYDROCHLORIDE 4 MG/2ML
PRN INJECTION, SOLUTION INTRAMUSCULAR; INTRAVENOUS
Refills: 0 | OUTPATIENT
Start: 2017-08-02 | End: 2017-11-07

## 2022-07-09 RX ORDER — OMEPRAZOLE 40 MG/1
CAPSULE, DELAYED RELEASE ORAL TWICE A DAY
Refills: 0 | OUTPATIENT
Start: 2010-06-08 | End: 2011-09-08

## 2022-07-09 RX ORDER — NITROGLYCERIN 0.4 MG/1
TABLET SUBLINGUAL ONCE A DAY
Refills: 0 | OUTPATIENT
Start: 2018-01-17 | End: 2018-05-09

## 2022-07-09 RX ORDER — DEXLANSOPRAZOLE 60 MG/1
CAPSULE, DELAYED RELEASE ORAL
Refills: 0 | OUTPATIENT
Start: 2017-11-07 | End: 2018-01-17

## 2022-07-09 RX ORDER — TESTOSTERONE CYPIONATE 100 MG/ML
INJECTION, SOLUTION INTRAMUSCULAR ONCE A DAY
Refills: 0 | OUTPATIENT
Start: 2016-04-13 | End: 2016-06-08

## 2022-07-09 RX ORDER — KETOCONAZOLE 20 MG/G
CREAM TOPICAL ONCE A DAY
Refills: 0 | OUTPATIENT
Start: 2018-05-09 | End: 2018-09-25

## 2022-07-09 RX ORDER — IPRATROPIUM BROMIDE 0.5 MG/2.5ML
SOLUTION RESPIRATORY (INHALATION) ONCE A DAY
Refills: 0 | OUTPATIENT
Start: 2016-06-08 | End: 2017-03-28

## 2022-07-09 RX ORDER — KETOCONAZOLE 20 MG/G
CREAM TOPICAL ONCE A DAY
Refills: 0 | OUTPATIENT
Start: 2017-08-02 | End: 2017-11-07

## 2022-07-09 RX ORDER — IPRATROPIUM BROMIDE 0.5 MG/2.5ML
SOLUTION RESPIRATORY (INHALATION) ONCE A DAY
Refills: 0 | OUTPATIENT
Start: 2017-08-02 | End: 2017-11-07

## 2022-07-09 RX ORDER — ISOSORBIDE MONONITRATE 30 MG/1
TABLET, EXTENDED RELEASE ORAL ONCE A DAY
Refills: 0 | OUTPATIENT
Start: 2018-05-09 | End: 2018-09-25

## 2022-07-09 RX ORDER — MONTELUKAST SODIUM 10 MG/1
TABLET, FILM COATED ORAL ONCE A DAY
Refills: 0 | OUTPATIENT
Start: 2018-09-25 | End: 2021-10-11

## 2022-07-09 RX ORDER — PANCRELIPASE 24000; 76000; 120000 [USP'U]/1; [USP'U]/1; [USP'U]/1
CAPSULE, DELAYED RELEASE PELLETS ORAL TWICE A DAY
Refills: 0 | OUTPATIENT
Start: 2018-05-09 | End: 2018-09-25

## 2022-07-09 RX ORDER — TESTOSTERONE CYPIONATE 100 MG/ML
INJECTION, SOLUTION INTRAMUSCULAR TAKE AS DIRECTED
Refills: 0 | OUTPATIENT
Start: 2015-10-13 | End: 2016-04-13

## 2022-07-09 RX ORDER — ONDANSETRON HYDROCHLORIDE 4 MG/2ML
PRN INJECTION, SOLUTION INTRAMUSCULAR; INTRAVENOUS
Refills: 0 | OUTPATIENT
Start: 2018-01-17 | End: 2018-05-09

## 2022-07-09 RX ORDER — FUROSEMIDE 40 MG/1
TABLET ORAL
Refills: 0 | OUTPATIENT
Start: 2011-09-08 | End: 2011-10-14

## 2022-07-09 RX ORDER — ISOSORBIDE MONONITRATE 30 MG/1
TABLET, EXTENDED RELEASE ORAL
Refills: 0 | OUTPATIENT
Start: 2015-10-13 | End: 2016-04-13

## 2022-07-09 RX ORDER — PANCRELIPASE 24000; 76000; 120000 [USP'U]/1; [USP'U]/1; [USP'U]/1
TAKE 1 CAPSULE BY MOUTH TWICE A DAY CAPSULE, DELAYED RELEASE PELLETS ORAL
Refills: 1 | OUTPATIENT
Start: 2018-04-05 | End: 2018-05-09

## 2022-07-09 RX ORDER — DEXLANSOPRAZOLE 60 MG/1
CAPSULE, DELAYED RELEASE ORAL ONCE A DAY
Refills: 3 | OUTPATIENT
Start: 2014-01-29 | End: 2015-02-10

## 2022-07-09 RX ORDER — NITROGLYCERIN 0.4 MG/1
TABLET SUBLINGUAL
Refills: 0 | OUTPATIENT
Start: 2015-10-13 | End: 2016-04-13

## 2022-07-09 RX ORDER — METOPROLOL SUCCINATE 25 MG/1
TABLET, EXTENDED RELEASE ORAL ONCE A DAY
Refills: 0 | OUTPATIENT
Start: 2017-03-28 | End: 2017-05-24

## 2022-07-09 RX ORDER — AMLODIPINE BESYLATE 5 MG/1
TABLET ORAL
Refills: 0 | OUTPATIENT
Start: 2014-01-07 | End: 2015-10-13

## 2022-07-09 RX ORDER — SACCHAROMYCES BOULARDII 250 MG
ONCE A DAY CAPSULE ORAL ONCE A DAY
Refills: 1 | OUTPATIENT
Start: 2015-10-13 | End: 2017-03-28

## 2022-07-09 RX ORDER — ONDANSETRON HYDROCHLORIDE 4 MG/2ML
PRN INJECTION, SOLUTION INTRAMUSCULAR; INTRAVENOUS
Refills: 0 | OUTPATIENT
Start: 2018-05-09 | End: 2018-09-25

## 2022-07-09 RX ORDER — TESTOSTERONE CYPIONATE 100 MG/ML
INJECTION, SOLUTION INTRAMUSCULAR ONCE A DAY
Refills: 0 | OUTPATIENT
Start: 2018-05-09 | End: 2018-09-25

## 2022-07-09 RX ORDER — TIOTROPIUM BROMIDE 18 UG/1
CAPSULE ORAL; RESPIRATORY (INHALATION) ONCE A DAY
Refills: 0 | OUTPATIENT
Start: 2017-11-07 | End: 2018-01-17

## 2022-07-09 RX ORDER — ALBUTEROL SULFATE 0.63 MG/3ML
SOLUTION RESPIRATORY (INHALATION) ONCE A DAY
Refills: 0 | OUTPATIENT
Start: 2016-06-08 | End: 2017-03-28

## 2022-07-09 RX ORDER — SUCRALFATE 1 G/1
TWICE A DAY TABLET ORAL TWICE A DAY
Refills: 0 | OUTPATIENT
Start: 2016-06-08 | End: 2017-03-28

## 2022-07-09 RX ORDER — DEXLANSOPRAZOLE 60 MG/1
ONCE A DAY CAPSULE, DELAYED RELEASE ORAL ONCE A DAY
Refills: 3 | OUTPATIENT
Start: 2016-06-08 | End: 2017-03-28

## 2022-07-09 RX ORDER — ISOSORBIDE MONONITRATE 30 MG/1
TABLET, EXTENDED RELEASE ORAL ONCE A DAY
Refills: 0 | OUTPATIENT
Start: 2016-06-08 | End: 2017-03-28

## 2022-07-09 RX ORDER — ISOSORBIDE MONONITRATE 30 MG/1
TABLET, EXTENDED RELEASE ORAL ONCE A DAY
Refills: 0 | OUTPATIENT
Start: 2017-05-24 | End: 2017-08-02

## 2022-07-09 RX ORDER — FAMOTIDINE 10 MG
TABLET ORAL
Refills: 0 | OUTPATIENT
Start: 2018-09-25 | End: 2021-10-11

## 2022-07-09 RX ORDER — MONTELUKAST SODIUM 10 MG/1
TABLET, FILM COATED ORAL
Refills: 0 | OUTPATIENT
Start: 2015-10-13 | End: 2016-04-13

## 2022-07-09 RX ORDER — TESTOSTERONE CYPIONATE 100 MG/ML
INJECTION, SOLUTION INTRAMUSCULAR ONCE A DAY
Refills: 0 | OUTPATIENT
Start: 2018-09-25 | End: 2021-10-11

## 2022-07-09 RX ORDER — DEXLANSOPRAZOLE 60 MG/1
CAPSULE, DELAYED RELEASE ORAL
Refills: 0 | OUTPATIENT
Start: 2021-10-11 | End: 2022-01-12

## 2022-07-09 RX ORDER — FUROSEMIDE 40 MG/1
TABLET ORAL ONCE A DAY
Refills: 0 | OUTPATIENT
Start: 2018-09-25 | End: 2021-10-11

## 2022-07-09 RX ORDER — SILDENAFIL CITRATE 100 MG/1
TABLET, FILM COATED ORAL
Refills: 0 | OUTPATIENT
Start: 2010-01-04 | End: 2010-12-17

## 2022-07-09 RX ORDER — KETOCONAZOLE 20 MG/G
CREAM TOPICAL ONCE A DAY
Refills: 0 | OUTPATIENT
Start: 2018-01-17 | End: 2018-05-09

## 2022-07-09 RX ORDER — NITROGLYCERIN 0.4 MG/1
TABLET SUBLINGUAL ONCE A DAY
Refills: 0 | OUTPATIENT
Start: 2017-11-07 | End: 2018-01-17

## 2022-07-09 RX ORDER — TIOTROPIUM BROMIDE 18 UG/1
CAPSULE ORAL; RESPIRATORY (INHALATION) ONCE A DAY
Refills: 0 | OUTPATIENT
Start: 2016-04-13 | End: 2016-06-08

## 2022-07-09 RX ORDER — FUROSEMIDE 40 MG/1
TABLET ORAL ONCE A DAY
Refills: 0 | OUTPATIENT
Start: 2016-06-08 | End: 2017-03-28

## 2022-07-09 RX ORDER — NITROGLYCERIN 0.4 MG/1
TABLET SUBLINGUAL ONCE A DAY
Refills: 0 | OUTPATIENT
Start: 2016-06-08 | End: 2017-03-28

## 2022-07-09 RX ORDER — TIOTROPIUM BROMIDE 18 UG/1
CAPSULE ORAL; RESPIRATORY (INHALATION)
Refills: 0 | OUTPATIENT
Start: 2015-10-13 | End: 2016-04-13

## 2022-07-09 RX ORDER — ISOSORBIDE MONONITRATE 30 MG/1
TABLET, EXTENDED RELEASE ORAL ONCE A DAY
Refills: 0 | OUTPATIENT
Start: 2021-10-11 | End: 2022-01-12

## 2022-07-09 RX ORDER — DEXLANSOPRAZOLE 60 MG/1
CAPSULE, DELAYED RELEASE ORAL
Refills: 0 | OUTPATIENT
Start: 2017-03-28 | End: 2017-03-28

## 2022-07-09 RX ORDER — IPRATROPIUM BROMIDE 0.5 MG/2.5ML
SOLUTION RESPIRATORY (INHALATION)
Refills: 0 | OUTPATIENT
Start: 2011-09-08 | End: 2014-01-07

## 2022-07-09 RX ORDER — ONDANSETRON HYDROCHLORIDE 4 MG/2ML
PRN INJECTION, SOLUTION INTRAMUSCULAR; INTRAVENOUS
Refills: 0 | OUTPATIENT
Start: 2017-05-24 | End: 2017-08-02

## 2022-07-09 RX ORDER — TIOTROPIUM BROMIDE 18 UG/1
CAPSULE ORAL; RESPIRATORY (INHALATION) ONCE A DAY
Refills: 0 | OUTPATIENT
Start: 2017-08-02 | End: 2017-11-07

## 2022-07-09 RX ORDER — KETOCONAZOLE 20 MG/G
CREAM TOPICAL ONCE A DAY
Refills: 0 | OUTPATIENT
Start: 2021-10-11 | End: 2022-01-12

## 2022-07-09 RX ORDER — IPRATROPIUM BROMIDE 0.5 MG/2.5ML
SOLUTION RESPIRATORY (INHALATION) ONCE A DAY
Refills: 0 | OUTPATIENT
Start: 2018-05-09 | End: 2018-09-25

## 2022-07-09 RX ORDER — MONTELUKAST SODIUM 10 MG/1
TABLET, FILM COATED ORAL ONCE A DAY
Refills: 0 | OUTPATIENT
Start: 2017-03-28 | End: 2017-05-24

## 2022-07-09 RX ORDER — ISOSORBIDE MONONITRATE 30 MG/1
TABLET, EXTENDED RELEASE ORAL ONCE A DAY
Refills: 0 | OUTPATIENT
Start: 2018-09-25 | End: 2021-10-11

## 2022-07-09 RX ORDER — NITROGLYCERIN 0.4 MG/1
TABLET SUBLINGUAL ONCE A DAY
Refills: 0 | OUTPATIENT
Start: 2017-05-24 | End: 2017-08-02

## 2022-07-09 RX ORDER — MONTELUKAST SODIUM 10 MG/1
TABLET, FILM COATED ORAL ONCE A DAY
Refills: 0 | OUTPATIENT
Start: 2017-08-02 | End: 2017-11-07

## 2022-07-09 RX ORDER — AMLODIPINE BESYLATE 5 MG/1
TABLET ORAL ONCE A DAY
Refills: 0 | OUTPATIENT
Start: 2016-04-13 | End: 2016-06-08

## 2022-07-09 RX ORDER — AMLODIPINE BESYLATE 5 MG/1
TABLET ORAL ONCE A DAY
Refills: 0 | OUTPATIENT
Start: 2017-03-28 | End: 2017-05-24

## 2022-07-09 RX ORDER — DEXLANSOPRAZOLE 60 MG/1
ONCE A DAY CAPSULE, DELAYED RELEASE ORAL ONCE A DAY
Refills: 1 | OUTPATIENT
Start: 2017-03-28 | End: 2017-05-24

## 2022-07-09 RX ORDER — TIOTROPIUM BROMIDE 18 UG/1
CAPSULE ORAL; RESPIRATORY (INHALATION)
Refills: 0 | OUTPATIENT
Start: 2011-09-08 | End: 2014-01-07

## 2022-07-09 RX ORDER — PANCRELIPASE 24000; 76000; 120000 [USP'U]/1; [USP'U]/1; [USP'U]/1
TWICE A DAY CAPSULE, DELAYED RELEASE PELLETS ORAL TWICE A DAY
Refills: 1 | OUTPATIENT
Start: 2018-03-05 | End: 2018-04-05

## 2022-07-09 RX ORDER — AMLODIPINE BESYLATE 5 MG/1
TABLET ORAL
Refills: 0 | OUTPATIENT
Start: 2015-10-13 | End: 2016-04-13

## 2022-07-09 RX ORDER — AMLODIPINE BESYLATE 5 MG/1
TABLET ORAL
Refills: 0 | OUTPATIENT
Start: 2011-09-08 | End: 2014-01-07

## 2022-07-09 RX ORDER — TESTOSTERONE CYPIONATE 100 MG/ML
INJECTION, SOLUTION INTRAMUSCULAR ONCE A DAY
Refills: 0 | OUTPATIENT
Start: 2017-11-07 | End: 2018-01-17

## 2022-07-09 RX ORDER — ALBUTEROL SULFATE 0.63 MG/3ML
SOLUTION RESPIRATORY (INHALATION) ONCE A DAY
Refills: 0 | OUTPATIENT
Start: 2018-09-25 | End: 2021-10-11

## 2022-07-09 RX ORDER — NITROGLYCERIN 0.4 MG/1
TABLET SUBLINGUAL ONCE A DAY
Refills: 0 | OUTPATIENT
Start: 2018-09-25 | End: 2021-10-11

## 2022-07-09 RX ORDER — MONTELUKAST SODIUM 10 MG/1
TABLET, FILM COATED ORAL
Refills: 0 | OUTPATIENT
Start: 2011-09-08 | End: 2014-01-07

## 2022-07-09 RX ORDER — KETOCONAZOLE 20 MG/G
CREAM TOPICAL ONCE A DAY
Refills: 0 | OUTPATIENT
Start: 2016-04-13 | End: 2016-06-08

## 2022-07-09 RX ORDER — MONTELUKAST SODIUM 10 MG/1
TABLET, FILM COATED ORAL ONCE A DAY
Refills: 0 | OUTPATIENT
Start: 2018-05-09 | End: 2018-09-25

## 2022-07-09 RX ORDER — MONTELUKAST SODIUM 10 MG/1
TABLET, FILM COATED ORAL ONCE A DAY
Refills: 0 | OUTPATIENT
Start: 2016-04-13 | End: 2016-06-08

## 2022-07-09 RX ORDER — IPRATROPIUM BROMIDE 0.5 MG/2.5ML
SOLUTION RESPIRATORY (INHALATION) ONCE A DAY
Refills: 0 | OUTPATIENT
Start: 2017-11-07 | End: 2018-01-17

## 2022-07-09 RX ORDER — AMLODIPINE BESYLATE 5 MG/1
TABLET ORAL
Refills: 0 | OUTPATIENT
Start: 2010-01-04 | End: 2010-12-17

## 2022-07-09 RX ORDER — ALBUTEROL SULFATE 0.63 MG/3ML
SOLUTION RESPIRATORY (INHALATION) ONCE A DAY
Refills: 0 | OUTPATIENT
Start: 2017-03-28 | End: 2017-05-24

## 2022-07-09 RX ORDER — IBUPROFEN 800 MG/1
TABLET, FILM COATED ORAL
Refills: 0 | OUTPATIENT
Start: 2010-01-04 | End: 2010-12-17

## 2022-07-09 RX ORDER — TIOTROPIUM BROMIDE 18 UG/1
CAPSULE ORAL; RESPIRATORY (INHALATION) ONCE A DAY
Refills: 0 | OUTPATIENT
Start: 2017-03-28 | End: 2017-05-24

## 2022-07-09 RX ORDER — PANCRELIPASE 24000; 76000; 120000 [USP'U]/1; [USP'U]/1; [USP'U]/1
CAPSULE, DELAYED RELEASE PELLETS ORAL THREE TIMES A DAY
Refills: 0 | OUTPATIENT
Start: 2021-10-11 | End: 2022-01-12

## 2022-07-09 RX ORDER — FUROSEMIDE 40 MG/1
TABLET ORAL ONCE A DAY
Refills: 0 | OUTPATIENT
Start: 2018-01-17 | End: 2018-05-09

## 2022-07-09 RX ORDER — FUROSEMIDE 40 MG/1
TABLET ORAL
Refills: 0 | OUTPATIENT
Start: 2014-01-07 | End: 2015-10-13

## 2022-07-10 ENCOUNTER — TELEPHONE ENCOUNTER (OUTPATIENT)
Dept: URBAN - METROPOLITAN AREA CLINIC 121 | Facility: CLINIC | Age: 72
End: 2022-07-10

## 2022-07-10 RX ORDER — FAMOTIDINE 10 MG
TABLET ORAL
Refills: 0 | Status: ACTIVE | COMMUNITY
Start: 2022-01-12

## 2022-07-10 RX ORDER — AMLODIPINE BESYLATE 5 MG/1
TABLET ORAL
Refills: 0 | Status: ACTIVE | COMMUNITY
Start: 2010-12-17

## 2022-07-10 RX ORDER — DEXLANSOPRAZOLE 60 MG/1
CAPSULE, DELAYED RELEASE ORAL
Refills: 0 | Status: ACTIVE | COMMUNITY
Start: 2022-01-12

## 2022-07-10 RX ORDER — FUROSEMIDE 40 MG/1
TABLET ORAL ONCE A DAY
Refills: 0 | Status: ACTIVE | COMMUNITY
Start: 2022-01-12

## 2022-07-10 RX ORDER — OMEPRAZOLE 40 MG/1
TOMA UNA TABLETA DOS VECES AL DIA UNA HORA ANTES DE COMER CAPSULE, DELAYED RELEASE ORAL
Refills: 4 | Status: ACTIVE | COMMUNITY
Start: 2009-09-03

## 2022-07-10 RX ORDER — ALBUTEROL SULFATE 2.5 MG/3ML
SOLUTION RESPIRATORY (INHALATION)
Refills: 0 | Status: ACTIVE | COMMUNITY
Start: 2010-12-17

## 2022-07-10 RX ORDER — IBUPROFEN 800 MG/1
TABLET, FILM COATED ORAL
Refills: 0 | Status: ACTIVE | COMMUNITY
Start: 2010-12-17

## 2022-07-10 RX ORDER — TIOTROPIUM BROMIDE 18 UG/1
CAPSULE ORAL; RESPIRATORY (INHALATION) ONCE A DAY
Refills: 0 | Status: ACTIVE | COMMUNITY
Start: 2022-01-12

## 2022-07-10 RX ORDER — FAMOTIDINE 20 MG/1
TABLET, FILM COATED ORAL ONCE A DAY
Refills: 11 | Status: ACTIVE | COMMUNITY
Start: 2010-06-08

## 2022-07-10 RX ORDER — IPRATROPIUM BROMIDE 0.5 MG/2.5ML
SOLUTION RESPIRATORY (INHALATION) ONCE A DAY
Refills: 0 | Status: ACTIVE | COMMUNITY
Start: 2022-01-12

## 2022-07-10 RX ORDER — PREDNISONE 10 MG/1
1 BID FOR 1 WEEK THEN ONE A DAY UNTIL GONE TABLET ORAL
Refills: 0 | Status: ACTIVE | COMMUNITY
Start: 2011-03-30

## 2022-07-10 RX ORDER — ISOSORBIDE MONONITRATE 30 MG/1
TABLET, EXTENDED RELEASE ORAL
Refills: 0 | Status: ACTIVE | COMMUNITY
Start: 2010-12-17

## 2022-07-10 RX ORDER — KETOCONAZOLE 20 MG/G
CREAM TOPICAL
Refills: 0 | Status: ACTIVE | COMMUNITY
Start: 2010-12-17

## 2022-07-10 RX ORDER — ONDANSETRON HYDROCHLORIDE 4 MG/2ML
PRN INJECTION, SOLUTION INTRAMUSCULAR; INTRAVENOUS AS NEEDED
Refills: 0 | Status: ACTIVE | COMMUNITY
Start: 2022-01-12

## 2022-07-10 RX ORDER — SILDENAFIL CITRATE 100 MG/1
TABLET, FILM COATED ORAL
Refills: 0 | Status: ACTIVE | COMMUNITY
Start: 2010-12-17

## 2022-07-10 RX ORDER — RAMIPRIL 2.5 MG/1
CAPSULE ORAL ONCE A DAY
Refills: 0 | Status: ACTIVE | COMMUNITY
Start: 2022-01-12

## 2022-07-10 RX ORDER — CHOLESTYRAMINE 4 G/9G
POWDER, FOR SUSPENSION ORAL ONCE A DAY
Refills: 3 | Status: ACTIVE | COMMUNITY
Start: 2012-02-15

## 2022-07-10 RX ORDER — ROSUVASTATIN CALCIUM 20 MG/1
TABLET, FILM COATED ORAL ONCE A DAY
Refills: 0 | Status: ACTIVE | COMMUNITY
Start: 2022-01-12

## 2022-07-10 RX ORDER — DEXLANSOPRAZOLE 60 MG/1
ONCE A DAY CAPSULE, DELAYED RELEASE ORAL ONCE A DAY
Refills: 3 | Status: ACTIVE | COMMUNITY
Start: 2018-09-25

## 2022-07-10 RX ORDER — MONTELUKAST SODIUM 10 MG/1
TABLET, FILM COATED ORAL ONCE A DAY
Refills: 0 | Status: ACTIVE | COMMUNITY
Start: 2022-01-12

## 2022-07-10 RX ORDER — KETOCONAZOLE 20 MG/G
CREAM TOPICAL ONCE A DAY
Refills: 0 | Status: ACTIVE | COMMUNITY
Start: 2022-01-12

## 2022-07-10 RX ORDER — HYDROCORTISONE 2.5% 25 MG/G
CREAM TOPICAL ONCE A DAY
Refills: 0 | Status: ACTIVE | COMMUNITY
Start: 2012-07-05

## 2022-07-10 RX ORDER — PANCRELIPASE 24000; 76000; 120000 [USP'U]/1; [USP'U]/1; [USP'U]/1
THREE TIMES A DAY CAPSULE, DELAYED RELEASE PELLETS ORAL THREE TIMES A DAY
Refills: 0 | Status: ACTIVE | COMMUNITY
Start: 2020-01-15

## 2022-07-10 RX ORDER — ISOSORBIDE MONONITRATE 30 MG/1
TABLET, EXTENDED RELEASE ORAL ONCE A DAY
Refills: 0 | Status: ACTIVE | COMMUNITY
Start: 2022-01-12

## 2022-07-10 RX ORDER — IPRATROPIUM BROMIDE AND ALBUTEROL SULFATE 2.5; .5 MG/3ML; MG/3ML
SOLUTION RESPIRATORY (INHALATION)
Refills: 0 | Status: ACTIVE | COMMUNITY
Start: 2010-12-17

## 2022-07-10 RX ORDER — METOPROLOL SUCCINATE 25 MG/1
TABLET, EXTENDED RELEASE ORAL ONCE A DAY
Refills: 0 | Status: ACTIVE | COMMUNITY
Start: 2022-01-12

## 2022-07-10 RX ORDER — PANCRELIPASE 24000; 76000; 120000 [USP'U]/1; [USP'U]/1; [USP'U]/1
CAPSULE, DELAYED RELEASE PELLETS ORAL THREE TIMES A DAY
Refills: 0 | Status: ACTIVE | COMMUNITY
Start: 2022-01-12

## 2022-07-10 RX ORDER — TESTOSTERONE CYPIONATE 100 MG/ML
INJECTION, SOLUTION INTRAMUSCULAR ONCE A DAY
Refills: 0 | Status: ACTIVE | COMMUNITY
Start: 2022-01-12

## 2022-07-10 RX ORDER — ALBUTEROL SULFATE 0.63 MG/3ML
SOLUTION RESPIRATORY (INHALATION) ONCE A DAY
Refills: 0 | Status: ACTIVE | COMMUNITY
Start: 2022-01-12

## 2022-07-10 RX ORDER — CIPROFLOXACIN HCL 500 MG
TABLET ORAL TWICE A DAY
Refills: 0 | Status: ACTIVE | COMMUNITY
Start: 2011-02-07

## 2022-07-10 RX ORDER — DICYCLOMINE HYDROCHLORIDE 20 MG/2ML
TAKE 1 TABLET PO TID PRN ABDOMINAL PAIN INJECTION, SOLUTION INTRAMUSCULAR THREE TIMES A DAY
Refills: 6 | Status: ACTIVE | COMMUNITY
Start: 2011-02-07

## 2022-07-10 RX ORDER — AMLODIPINE BESYLATE 5 MG/1
TABLET ORAL ONCE A DAY
Refills: 0 | Status: ACTIVE | COMMUNITY
Start: 2022-01-12

## 2022-07-10 RX ORDER — MONTELUKAST SODIUM 10 MG/1
TABLET, FILM COATED ORAL
Refills: 0 | Status: ACTIVE | COMMUNITY
Start: 2010-12-17

## 2022-07-10 RX ORDER — DICYCLOMINE HYDROCHLORIDE 20 MG/1
TAKE UP TO FOUR TIMES A DAY TABLET ORAL
Refills: 2 | Status: ACTIVE | COMMUNITY
Start: 2009-09-03

## 2022-07-10 RX ORDER — NITROGLYCERIN 0.4 MG/1
TABLET SUBLINGUAL ONCE A DAY
Refills: 0 | Status: ACTIVE | COMMUNITY
Start: 2022-01-12

## 2022-07-10 RX ORDER — HYDROCORTISONE 25 MG/G
QD CREAM TOPICAL
Refills: 11 | Status: ACTIVE | COMMUNITY
Start: 2010-03-08

## 2022-07-10 RX ORDER — SULFASALAZINE 500 MG/1
TABLET ORAL
Refills: 3 | Status: ACTIVE | COMMUNITY
Start: 2011-09-12

## 2022-07-10 RX ORDER — METOPROLOL TARTRATE 25 MG/1
TABLET, FILM COATED ORAL
Refills: 0 | Status: ACTIVE | COMMUNITY
Start: 2010-12-17

## 2022-07-10 RX ORDER — FOLIC ACID 1 MG/1
TABLET ORAL ONCE A DAY
Refills: 3 | Status: ACTIVE | COMMUNITY
Start: 2011-09-12

## 2022-07-19 ENCOUNTER — TELEPHONE ENCOUNTER (OUTPATIENT)
Dept: URBAN - METROPOLITAN AREA CLINIC 63 | Facility: CLINIC | Age: 72
End: 2022-07-19

## 2022-10-18 ENCOUNTER — COMPREHENSIVE EXAM (OUTPATIENT)
Dept: URBAN - METROPOLITAN AREA CLINIC 26 | Facility: CLINIC | Age: 72
End: 2022-10-18

## 2022-10-18 DIAGNOSIS — H31.092: ICD-10-CM

## 2022-10-18 DIAGNOSIS — H43.393: ICD-10-CM

## 2022-10-18 DIAGNOSIS — H47.092: ICD-10-CM

## 2022-10-18 DIAGNOSIS — H33.102: ICD-10-CM

## 2022-10-18 DIAGNOSIS — H04.123: ICD-10-CM

## 2022-10-18 DIAGNOSIS — H43.813: ICD-10-CM

## 2022-10-18 DIAGNOSIS — D31.31: ICD-10-CM

## 2022-10-18 DIAGNOSIS — H35.372: ICD-10-CM

## 2022-10-18 DIAGNOSIS — H35.3131: ICD-10-CM

## 2022-10-18 PROCEDURE — 92250 FUNDUS PHOTOGRAPHY W/I&R: CPT

## 2022-10-18 PROCEDURE — 92134 CPTRZ OPH DX IMG PST SGM RTA: CPT

## 2022-10-18 PROCEDURE — 92014 COMPRE OPH EXAM EST PT 1/>: CPT

## 2022-10-18 ASSESSMENT — VISUAL ACUITY
OS_SC: 20/50-1
OS_PH: 20/30-1
OD_SC: 20/25

## 2022-10-18 ASSESSMENT — TONOMETRY
OD_IOP_MMHG: 15
OS_IOP_MMHG: 12

## 2023-04-17 ENCOUNTER — FOLLOW UP (OUTPATIENT)
Dept: URBAN - METROPOLITAN AREA CLINIC 26 | Facility: CLINIC | Age: 73
End: 2023-04-17

## 2023-04-17 VITALS — BODY MASS INDEX: 36.8 KG/M2 | HEIGHT: 66 IN | WEIGHT: 229 LBS

## 2023-04-17 DIAGNOSIS — Z96.1: ICD-10-CM

## 2023-04-17 DIAGNOSIS — H02.836: ICD-10-CM

## 2023-04-17 DIAGNOSIS — H33.102: ICD-10-CM

## 2023-04-17 DIAGNOSIS — H04.123: ICD-10-CM

## 2023-04-17 DIAGNOSIS — D31.31: ICD-10-CM

## 2023-04-17 DIAGNOSIS — H31.092: ICD-10-CM

## 2023-04-17 DIAGNOSIS — H43.393: ICD-10-CM

## 2023-04-17 DIAGNOSIS — H35.372: ICD-10-CM

## 2023-04-17 DIAGNOSIS — H02.833: ICD-10-CM

## 2023-04-17 DIAGNOSIS — H43.813: ICD-10-CM

## 2023-04-17 DIAGNOSIS — H47.092: ICD-10-CM

## 2023-04-17 DIAGNOSIS — H35.3131: ICD-10-CM

## 2023-04-17 PROCEDURE — 92134 CPTRZ OPH DX IMG PST SGM RTA: CPT

## 2023-04-17 PROCEDURE — 92014 COMPRE OPH EXAM EST PT 1/>: CPT

## 2023-04-17 PROCEDURE — 92250 FUNDUS PHOTOGRAPHY W/I&R: CPT

## 2023-04-17 ASSESSMENT — TONOMETRY
OS_IOP_MMHG: 13
OD_IOP_MMHG: 12

## 2023-04-17 ASSESSMENT — VISUAL ACUITY
OS_PH: 20/30-2
OS_SC: 20/40-1
OD_SC: 20/25+2

## 2023-07-12 ENCOUNTER — OFFICE VISIT (OUTPATIENT)
Dept: URBAN - METROPOLITAN AREA CLINIC 63 | Facility: CLINIC | Age: 73
End: 2023-07-12
Payer: MEDICARE

## 2023-07-12 ENCOUNTER — LAB OUTSIDE AN ENCOUNTER (OUTPATIENT)
Dept: URBAN - METROPOLITAN AREA CLINIC 63 | Facility: CLINIC | Age: 73
End: 2023-07-12

## 2023-07-12 ENCOUNTER — WEB ENCOUNTER (OUTPATIENT)
Dept: URBAN - METROPOLITAN AREA CLINIC 63 | Facility: CLINIC | Age: 73
End: 2023-07-12

## 2023-07-12 VITALS
OXYGEN SATURATION: 98 % | WEIGHT: 234.4 LBS | BODY MASS INDEX: 37.67 KG/M2 | SYSTOLIC BLOOD PRESSURE: 120 MMHG | DIASTOLIC BLOOD PRESSURE: 80 MMHG | TEMPERATURE: 97.6 F | HEIGHT: 66 IN | HEART RATE: 102 BPM

## 2023-07-12 DIAGNOSIS — Z80.0 FAMILY HISTORY OF MALIGNANT NEOPLASM OF DIGESTIVE ORGANS: ICD-10-CM

## 2023-07-12 DIAGNOSIS — K64.8 OTHER HEMORRHOIDS: ICD-10-CM

## 2023-07-12 DIAGNOSIS — D50.0 IRON DEFICIENCY ANEMIA DUE TO CHRONIC BLOOD LOSS: ICD-10-CM

## 2023-07-12 DIAGNOSIS — K21.00 GASTRO-ESOPHAGEAL REFLUX DISEASE WITH ESOPHAGITIS, WITHOUT BLEEDING: ICD-10-CM

## 2023-07-12 PROBLEM — 724556004: Status: ACTIVE | Noted: 2023-07-12

## 2023-07-12 PROCEDURE — 99214 OFFICE O/P EST MOD 30 MIN: CPT | Performed by: INTERNAL MEDICINE

## 2023-07-12 RX ORDER — DEXLANSOPRAZOLE 60 MG/1
CAPSULE, DELAYED RELEASE ORAL
Refills: 0 | Status: ACTIVE | COMMUNITY
Start: 2022-01-12

## 2023-07-12 RX ORDER — METOPROLOL SUCCINATE 50 MG/1
1 TABLET TABLET, FILM COATED, EXTENDED RELEASE ORAL ONCE A DAY
Refills: 0 | Status: ACTIVE | COMMUNITY
Start: 2022-01-12

## 2023-07-12 RX ORDER — HYDROCORTISONE 2.5% 25 MG/G
CREAM TOPICAL ONCE A DAY
Refills: 0 | Status: ACTIVE | COMMUNITY
Start: 2012-07-05

## 2023-07-12 RX ORDER — PREDNISONE 10 MG/1
1 BID FOR 1 WEEK THEN ONE A DAY UNTIL GONE TABLET ORAL
Refills: 0 | Status: ACTIVE | COMMUNITY
Start: 2011-03-30

## 2023-07-12 RX ORDER — MONTELUKAST SODIUM 10 MG/1
TABLET, FILM COATED ORAL ONCE A DAY
Refills: 0 | Status: ACTIVE | COMMUNITY
Start: 2022-01-12

## 2023-07-12 RX ORDER — ROSUVASTATIN CALCIUM 20 MG/1
TABLET, FILM COATED ORAL ONCE A DAY
Refills: 0 | Status: ACTIVE | COMMUNITY
Start: 2022-01-12

## 2023-07-12 RX ORDER — TIOTROPIUM BROMIDE 18 UG/1
CAPSULE ORAL; RESPIRATORY (INHALATION) ONCE A DAY
Refills: 0 | Status: ACTIVE | COMMUNITY
Start: 2022-01-12

## 2023-07-12 RX ORDER — IPRATROPIUM BROMIDE 0.5 MG/2.5ML
SOLUTION RESPIRATORY (INHALATION) ONCE A DAY
Refills: 0 | Status: ACTIVE | COMMUNITY
Start: 2022-01-12

## 2023-07-12 RX ORDER — KETOCONAZOLE 20 MG/G
CREAM TOPICAL ONCE A DAY
Refills: 0 | Status: ACTIVE | COMMUNITY
Start: 2022-01-12

## 2023-07-12 RX ORDER — ISOSORBIDE MONONITRATE 30 MG/1
TABLET, EXTENDED RELEASE ORAL ONCE A DAY
Refills: 0 | Status: ACTIVE | COMMUNITY
Start: 2022-01-12

## 2023-07-12 RX ORDER — AMLODIPINE BESYLATE 5 MG/1
TABLET ORAL
Refills: 0 | Status: ACTIVE | COMMUNITY
Start: 2010-12-17

## 2023-07-12 RX ORDER — ALBUTEROL SULFATE 0.63 MG/3ML
SOLUTION RESPIRATORY (INHALATION) ONCE A DAY
Refills: 0 | Status: ACTIVE | COMMUNITY
Start: 2022-01-12

## 2023-07-12 RX ORDER — RAMIPRIL 2.5 MG/1
CAPSULE ORAL ONCE A DAY
Refills: 0 | Status: ACTIVE | COMMUNITY
Start: 2022-01-12

## 2023-07-12 RX ORDER — IBUPROFEN 800 MG/1
TABLET, FILM COATED ORAL
Refills: 0 | Status: ACTIVE | COMMUNITY
Start: 2010-12-17

## 2023-07-12 RX ORDER — FUROSEMIDE 40 MG/1
TABLET ORAL ONCE A DAY
Refills: 0 | Status: ACTIVE | COMMUNITY
Start: 2022-01-12

## 2023-07-12 RX ORDER — ALBUTEROL SULFATE 2.5 MG/3ML
SOLUTION RESPIRATORY (INHALATION)
Refills: 0 | Status: ACTIVE | COMMUNITY
Start: 2010-12-17

## 2023-07-12 RX ORDER — NITROGLYCERIN 0.4 MG/1
TABLET SUBLINGUAL ONCE A DAY
Refills: 0 | Status: ACTIVE | COMMUNITY
Start: 2022-01-12

## 2023-07-12 RX ORDER — HYDROCORTISONE 25 MG/G
QD CREAM TOPICAL
Refills: 11 | Status: ACTIVE | COMMUNITY
Start: 2010-03-08

## 2023-07-12 NOTE — HPI-TODAY'S VISIT:
He finished the Prednisone and the LLQ pain has totally resolved. He continues with loose stools about 4-5 times a day. He is also having some pain around the area of the previous hernia repair.  Patient with chronic diarrhea with no evidence of colitis or  celiac disease plan was to start lomotil to control the diarrhea.  Patient had colonoscopy with adenoma polyp removed will need colonoscopy surveillance in 5 years.  Patient had some constipation and cholestyramine was stopped, patient doing well.  Patient comes for surveillance colonoscopy with h/o polyps, with family h/o colon cancer (father, uncles, grandfather)  as per patient request will do at the beginning of .  Colonoscopy showed internal hemorrhoids,  multiple adenomas will repeat colonoscopy in one year.  14: comes for surveillance colonoscopy  will do colonoscopy. patient with reflux and epigastric pain will do EGD to evaluate and r/o gastritis, esophagitis.  Will continue PPI.  : patient had colonoscopy with evidence of adenomas and hyperplastic polyps will need a repeat colonoscopy in 1 years.  Patient with strong family h/o colon cancer.  EGD does showed gastritis negative for H pylori no evidence of Jo's. Discussed the need for appropriate follow-up care.  Patient will be placed in our recall system and a letter will be mailed to the patient.   3/15 Patient is here for his screen colonoscopy, stated has history of multiple polyps and family history of colon cancer, stated has acid reflux, epigastric pain, and slow digestion and nausea. Plan EGD, Colonoscopy, Gastric Emptying study. Zofran, Ranitidine Dexilant. RUQ. 6/15: patient had ultrasound that showed fatty liver and renal cysts, GES is normal EGD and colonoscopy showed  hiatal hernia , gastritis negative for h pylori and colonoscopy showed polyps, hyperplastic and adenoma will repeat colonoscopy in 3 years.  Patient with bloating and abdominal distension. Will start proctitis, will need to do diet. 10/15: Patient with abdominal discomfort with improvement with probiotics, (florastor) but the insurance does not cover the probiotics.  Will try another probiotics and will continue with PPI. : Patient went to [place] with epigastric pain, with reflux will continue PPI and will consider EGD to r/o PUD/ h pylori status. Patient with change on bowel habit  and LLQ pain, with some improvement of the diarrhea and now constipation, will plan colonoscopy.  In the last 3 months patient had another family member his aunt with colon cancer. : Patient had EGD and colonoscopy with evidence of  hiatal hernia gastritis negative for h pylori, colonoscopy with hemorrhoids and polyps adenoma (3 in descending colon) Will do surveillance colonoscopy in 3 years. Will try in 2 years as per patient request.  3/17:  Patient comes with discomfort in his left abdomen and increase in gas, will restart probiotics, will continue with dexilant. Patient with personal h/o colonic polyps with multiples family members with colon cancer, found out recently of 2 more familiars with colon cancer and he is afraid  because of his high risk.  Will plan colonoscopy this summer.  :  Patient persist with bloating will do probiotics, insurance did not cover the treatment, will give sample.  Patient will need colonoscopy and EGD patient with trip to [place] will plan endoscopic studies in 2017.   : patient had EGD and colonoscopy with evidence of hiatal hernia, and gastritis negative for h pylori, colonoscopy showed a 8 mm polyp at the transverse colon and 2 more polyps in the area smaller 5 mm in diameter. Internal hemorrhoids and 3 small polyps at the descending colon. All the polyps are tubular adenoma will repeat colonoscopy in 3 years. Patient with large scar in the mid abdomen with discomfort, and ventral hernia.  :  Patient comes with persistent symptom of discomfort in the abdomen, no evidence of obstruction, with bloating and gas, was treated with probiotics with some improvement. WIll CT scan of abdomen and pelvis to r/o hernias that could be causing partial obstruction. and will start IB diaz, will follow in 2 months. : Patient had CT scan with no pathology that could explain his symptoms. patient with bloating and fullness, with symptoms of slow digestion, and gas. Will start pancreatic enzymes, no improvement with probiotics. Previous GES was normal. Will start trial with creon and will follow in 2 months, If treatment do not improved the symptoms will consider short treatment with antibiotics follow by probiotics.  Patient said he is doing well with Creon once at day after his lunch. Patient is here requiring to have his colonoscopy done, he has medical history of multiples adenoma polyps in the last 3 years, also multiples family members with CRC ( Mainly aunts and uncles ).  Plan: Patient will continue with Creon and also will have surveillance colonoscopy.  Patient said he is doing well with Creon once at day after his lunch. also requesting Dexilant prescription due to reflux and gastritis symptoms at times. Colonoscopy with evidence of one tubular adenoma polyp, and internal hemorrhoids. Patient will do diet and continue with Dexilant and Creon, side effect of PPI were discussed with him. : Patient comes with abdominal pain, will plan EGD and patient with strong family h/o colon cancer wants to have a surveillance colonoscopy.  Will plan EGD and colonoscopy. Patient had a recent trip to [place] and start having dyspepsia, also found out that another cousin had colon cancer at age 60 and she was from her mother side, other were related to his father.  10/21: Patient had in 2020 EGD and colonoscopy with evidence of normal duodenum, gastritis negative for H pylori, distal esophageal reflux negative for Jo's esophagus. Colonoscopy showed tubular adenoma in the transverse colon ( 5 mm) will need repeat colonoscopy in 5 years ad internal hemorrhoids. Patient went to [facility] and since has epigastric pain  and bloating. Will do trial with PPI and will do EGD and will plan colonoscopy. Patient states that other cousin  of colon cancer in [place]. I tried to explain to him the last findings of his colonoscopy, patient states now has constipation and rare rectal bleeding. Will plan EGD and colonoscopy.  : Patient had EGD and colonoscopy with evidence of normal duodenum, gastritis negative for H pylori, fundic gland, distal esophageal reflux, with carditis, negative for Intestinal Metaplasia. Cecal Polyp ( tubular adenoma) and internal hemorrhoids, normal rectal mucosa. Will plan colonoscopy in 3 years. ; Patient comes for evaluation after oncologist who recommended to repeat endoscopic evaluation for drop in HB and with iron deficiency, will do EGD and colonoscopy.  Patient takes occasional NSAID with black stool and  rare rectal bleeding.

## 2023-07-13 PROBLEM — 266433003: Status: ACTIVE | Noted: 2023-07-13

## 2023-07-19 ENCOUNTER — LAB OUTSIDE AN ENCOUNTER (OUTPATIENT)
Dept: URBAN - METROPOLITAN AREA CLINIC 63 | Facility: CLINIC | Age: 73
End: 2023-07-19

## 2023-08-22 ENCOUNTER — OFFICE VISIT (OUTPATIENT)
Dept: URBAN - METROPOLITAN AREA SURGERY CENTER 4 | Facility: SURGERY CENTER | Age: 73
End: 2023-08-22
Payer: MEDICARE

## 2023-08-22 ENCOUNTER — CLAIMS CREATED FROM THE CLAIM WINDOW (OUTPATIENT)
Dept: URBAN - METROPOLITAN AREA CLINIC 4 | Facility: CLINIC | Age: 73
End: 2023-08-22
Payer: MEDICARE

## 2023-08-22 DIAGNOSIS — K29.81 DUODENITIS WITH BLEEDING: ICD-10-CM

## 2023-08-22 DIAGNOSIS — Z86.010 COLON POLYP HISTORY: ICD-10-CM

## 2023-08-22 DIAGNOSIS — D50.9 ANEMIA, IRON DEFICIENCY: ICD-10-CM

## 2023-08-22 DIAGNOSIS — K64.1 GRADE II HEMORRHOIDS: ICD-10-CM

## 2023-08-22 DIAGNOSIS — K44.9 HIATAL HERNIA: ICD-10-CM

## 2023-08-22 DIAGNOSIS — D50.0 ANEMIA, IRON DEFICIENCY: ICD-10-CM

## 2023-08-22 DIAGNOSIS — K64.0 INTERNAL HEMORRHOIDS GRADE I: ICD-10-CM

## 2023-08-22 DIAGNOSIS — K29.50 CHRONIC GASTRITIS WITHOUT BLEEDING: ICD-10-CM

## 2023-08-22 DIAGNOSIS — Z86.010 ADENOMAS PERSONAL HISTORY OF COLONIC POLYPS: ICD-10-CM

## 2023-08-22 PROCEDURE — 43239 EGD BIOPSY SINGLE/MULTIPLE: CPT | Performed by: CLINIC/CENTER

## 2023-08-22 PROCEDURE — G0105 COLORECTAL SCRN; HI RISK IND: HCPCS | Performed by: CLINIC/CENTER

## 2023-08-22 PROCEDURE — 88342 IMHCHEM/IMCYTCHM 1ST ANTB: CPT | Performed by: PATHOLOGY

## 2023-08-22 PROCEDURE — 00813 ANES UPR LWR GI NDSC PX: CPT | Performed by: NURSE ANESTHETIST, CERTIFIED REGISTERED

## 2023-08-22 PROCEDURE — 43239 EGD BIOPSY SINGLE/MULTIPLE: CPT | Performed by: INTERNAL MEDICINE

## 2023-08-22 PROCEDURE — 88305 TISSUE EXAM BY PATHOLOGIST: CPT | Performed by: PATHOLOGY

## 2023-08-22 PROCEDURE — G0105 COLORECTAL SCRN; HI RISK IND: HCPCS | Performed by: INTERNAL MEDICINE

## 2023-08-22 RX ORDER — DEXLANSOPRAZOLE 60 MG/1
CAPSULE, DELAYED RELEASE ORAL
Refills: 0 | Status: ACTIVE | COMMUNITY
Start: 2022-01-12

## 2023-08-22 RX ORDER — RAMIPRIL 2.5 MG/1
CAPSULE ORAL ONCE A DAY
Refills: 0 | Status: ACTIVE | COMMUNITY
Start: 2022-01-12

## 2023-08-22 RX ORDER — PREDNISONE 10 MG/1
1 BID FOR 1 WEEK THEN ONE A DAY UNTIL GONE TABLET ORAL
Refills: 0 | Status: ACTIVE | COMMUNITY
Start: 2011-03-30

## 2023-08-22 RX ORDER — HYDROCORTISONE 2.5% 25 MG/G
CREAM TOPICAL ONCE A DAY
Refills: 0 | Status: ACTIVE | COMMUNITY
Start: 2012-07-05

## 2023-08-22 RX ORDER — MONTELUKAST SODIUM 10 MG/1
TABLET, FILM COATED ORAL ONCE A DAY
Refills: 0 | Status: ACTIVE | COMMUNITY
Start: 2022-01-12

## 2023-08-22 RX ORDER — IPRATROPIUM BROMIDE 0.5 MG/2.5ML
SOLUTION RESPIRATORY (INHALATION) ONCE A DAY
Refills: 0 | Status: ACTIVE | COMMUNITY
Start: 2022-01-12

## 2023-08-22 RX ORDER — AMLODIPINE BESYLATE 5 MG/1
TABLET ORAL
Refills: 0 | Status: ACTIVE | COMMUNITY
Start: 2010-12-17

## 2023-08-22 RX ORDER — FUROSEMIDE 40 MG/1
TABLET ORAL ONCE A DAY
Refills: 0 | Status: ACTIVE | COMMUNITY
Start: 2022-01-12

## 2023-08-22 RX ORDER — METOPROLOL SUCCINATE 50 MG/1
1 TABLET TABLET, FILM COATED, EXTENDED RELEASE ORAL ONCE A DAY
Refills: 0 | Status: ACTIVE | COMMUNITY
Start: 2022-01-12

## 2023-08-22 RX ORDER — ROSUVASTATIN CALCIUM 20 MG/1
TABLET, FILM COATED ORAL ONCE A DAY
Refills: 0 | Status: ACTIVE | COMMUNITY
Start: 2022-01-12

## 2023-08-22 RX ORDER — ALBUTEROL SULFATE 2.5 MG/3ML
SOLUTION RESPIRATORY (INHALATION)
Refills: 0 | Status: ACTIVE | COMMUNITY
Start: 2010-12-17

## 2023-08-22 RX ORDER — TIOTROPIUM BROMIDE 18 UG/1
CAPSULE ORAL; RESPIRATORY (INHALATION) ONCE A DAY
Refills: 0 | Status: ACTIVE | COMMUNITY
Start: 2022-01-12

## 2023-08-22 RX ORDER — NITROGLYCERIN 0.4 MG/1
TABLET SUBLINGUAL ONCE A DAY
Refills: 0 | Status: ACTIVE | COMMUNITY
Start: 2022-01-12

## 2023-08-22 RX ORDER — KETOCONAZOLE 20 MG/G
CREAM TOPICAL ONCE A DAY
Refills: 0 | Status: ACTIVE | COMMUNITY
Start: 2022-01-12

## 2023-08-22 RX ORDER — ALBUTEROL SULFATE 0.63 MG/3ML
SOLUTION RESPIRATORY (INHALATION) ONCE A DAY
Refills: 0 | Status: ACTIVE | COMMUNITY
Start: 2022-01-12

## 2023-08-22 RX ORDER — HYDROCORTISONE 25 MG/G
QD CREAM TOPICAL
Refills: 11 | Status: ACTIVE | COMMUNITY
Start: 2010-03-08

## 2023-08-22 RX ORDER — IBUPROFEN 800 MG/1
TABLET, FILM COATED ORAL
Refills: 0 | Status: ACTIVE | COMMUNITY
Start: 2010-12-17

## 2023-08-22 RX ORDER — ISOSORBIDE MONONITRATE 30 MG/1
TABLET, EXTENDED RELEASE ORAL ONCE A DAY
Refills: 0 | Status: ACTIVE | COMMUNITY
Start: 2022-01-12

## 2023-09-01 ENCOUNTER — TELEPHONE ENCOUNTER (OUTPATIENT)
Dept: URBAN - METROPOLITAN AREA CLINIC 63 | Facility: CLINIC | Age: 73
End: 2023-09-01

## 2023-09-05 ENCOUNTER — TELEPHONE ENCOUNTER (OUTPATIENT)
Dept: URBAN - METROPOLITAN AREA CLINIC 63 | Facility: CLINIC | Age: 73
End: 2023-09-05

## 2023-10-11 ENCOUNTER — OFFICE VISIT (OUTPATIENT)
Dept: URBAN - METROPOLITAN AREA CLINIC 63 | Facility: CLINIC | Age: 73
End: 2023-10-11
Payer: MEDICARE

## 2023-10-11 VITALS
TEMPERATURE: 97.7 F | SYSTOLIC BLOOD PRESSURE: 146 MMHG | HEIGHT: 66 IN | WEIGHT: 230 LBS | OXYGEN SATURATION: 95 % | DIASTOLIC BLOOD PRESSURE: 90 MMHG | HEART RATE: 94 BPM | BODY MASS INDEX: 36.96 KG/M2

## 2023-10-11 DIAGNOSIS — R14.0 BLOATING: ICD-10-CM

## 2023-10-11 DIAGNOSIS — K29.80 PEPTIC DUODENITIS: ICD-10-CM

## 2023-10-11 DIAGNOSIS — K30 FUNCTIONAL DYSPEPSIA: ICD-10-CM

## 2023-10-11 DIAGNOSIS — K64.1 GRADE II HEMORRHOIDS: ICD-10-CM

## 2023-10-11 PROCEDURE — 99214 OFFICE O/P EST MOD 30 MIN: CPT | Performed by: INTERNAL MEDICINE

## 2023-10-11 RX ORDER — HYDROCORTISONE 2.5% 25 MG/G
CREAM TOPICAL ONCE A DAY
Refills: 0 | Status: ACTIVE | COMMUNITY
Start: 2012-07-05

## 2023-10-11 RX ORDER — IBUPROFEN 800 MG/1
TABLET, FILM COATED ORAL
Refills: 0 | Status: ACTIVE | COMMUNITY
Start: 2010-12-17

## 2023-10-11 RX ORDER — FUROSEMIDE 40 MG/1
TABLET ORAL ONCE A DAY
Refills: 0 | Status: ACTIVE | COMMUNITY
Start: 2022-01-12

## 2023-10-11 RX ORDER — NITROGLYCERIN 0.4 MG/1
TABLET SUBLINGUAL ONCE A DAY
Refills: 0 | Status: ACTIVE | COMMUNITY
Start: 2022-01-12

## 2023-10-11 RX ORDER — PREDNISONE 10 MG/1
1 BID FOR 1 WEEK THEN ONE A DAY UNTIL GONE TABLET ORAL
Refills: 0 | Status: ACTIVE | COMMUNITY
Start: 2011-03-30

## 2023-10-11 RX ORDER — ALBUTEROL SULFATE 2.5 MG/3ML
SOLUTION RESPIRATORY (INHALATION)
Refills: 0 | Status: ACTIVE | COMMUNITY
Start: 2010-12-17

## 2023-10-11 RX ORDER — RAMIPRIL 2.5 MG/1
CAPSULE ORAL ONCE A DAY
Refills: 0 | Status: ACTIVE | COMMUNITY
Start: 2022-01-12

## 2023-10-11 RX ORDER — METOPROLOL SUCCINATE 50 MG/1
1 TABLET TABLET, FILM COATED, EXTENDED RELEASE ORAL ONCE A DAY
Refills: 0 | Status: ACTIVE | COMMUNITY
Start: 2022-01-12

## 2023-10-11 RX ORDER — HYDROCORTISONE 25 MG/G
QD CREAM TOPICAL
Refills: 11 | Status: ACTIVE | COMMUNITY
Start: 2010-03-08

## 2023-10-11 RX ORDER — PANCRELIPASE 36000; 180000; 114000 [USP'U]/1; [USP'U]/1; [USP'U]/1
TAKE 2 CAPSULE TWICE A DAY WITH MEALS CAPSULE, DELAYED RELEASE PELLETS ORAL
Qty: 120 | Refills: 2 | OUTPATIENT
Start: 2023-10-11 | End: 2024-01-08

## 2023-10-11 RX ORDER — KETOCONAZOLE 20 MG/G
CREAM TOPICAL ONCE A DAY
Refills: 0 | Status: ACTIVE | COMMUNITY
Start: 2022-01-12

## 2023-10-11 RX ORDER — TIOTROPIUM BROMIDE 18 UG/1
CAPSULE ORAL; RESPIRATORY (INHALATION) ONCE A DAY
Refills: 0 | Status: ACTIVE | COMMUNITY
Start: 2022-01-12

## 2023-10-11 RX ORDER — ISOSORBIDE MONONITRATE 30 MG/1
TABLET, EXTENDED RELEASE ORAL ONCE A DAY
Refills: 0 | Status: ACTIVE | COMMUNITY
Start: 2022-01-12

## 2023-10-11 RX ORDER — ALBUTEROL SULFATE 0.63 MG/3ML
SOLUTION RESPIRATORY (INHALATION) ONCE A DAY
Refills: 0 | Status: ACTIVE | COMMUNITY
Start: 2022-01-12

## 2023-10-11 RX ORDER — ROSUVASTATIN CALCIUM 20 MG/1
TABLET, FILM COATED ORAL ONCE A DAY
Refills: 0 | Status: ACTIVE | COMMUNITY
Start: 2022-01-12

## 2023-10-11 RX ORDER — AMLODIPINE BESYLATE 5 MG/1
TABLET ORAL
Refills: 0 | Status: ACTIVE | COMMUNITY
Start: 2010-12-17

## 2023-10-11 RX ORDER — DEXLANSOPRAZOLE 60 MG/1
1 CAPSULE CAPSULE, DELAYED RELEASE ORAL ONCE A DAY
Qty: 30 CAPSULE | Refills: 3 | OUTPATIENT
Start: 2022-01-12

## 2023-10-11 RX ORDER — HYDROCORTISONE 2.5% 25 MG/G
1 APPLICATION CREAM TOPICAL TWICE A DAY
Qty: 1 | Refills: 1 | OUTPATIENT
Start: 2012-07-05 | End: 2023-10-31

## 2023-10-11 RX ORDER — MONTELUKAST SODIUM 10 MG/1
TABLET, FILM COATED ORAL ONCE A DAY
Refills: 0 | Status: ACTIVE | COMMUNITY
Start: 2022-01-12

## 2023-10-11 RX ORDER — DEXLANSOPRAZOLE 60 MG/1
CAPSULE, DELAYED RELEASE ORAL
Refills: 0 | Status: ACTIVE | COMMUNITY
Start: 2022-01-12

## 2023-10-11 RX ORDER — IPRATROPIUM BROMIDE 0.5 MG/2.5ML
SOLUTION RESPIRATORY (INHALATION) ONCE A DAY
Refills: 0 | Status: ACTIVE | COMMUNITY
Start: 2022-01-12

## 2023-10-11 NOTE — HPI-TODAY'S VISIT:
He finished the Prednisone and the LLQ pain has totally resolved. He continues with loose stools about 4-5 times a day. He is also having some pain around the area of the previous hernia repair.  Patient with chronic diarrhea with no evidence of colitis or  celiac disease plan was to start lomotil to control the diarrhea.  Patient had colonoscopy with adenoma polyp removed will need colonoscopy surveillance in 5 years.  Patient had some constipation and cholestyramine was stopped, patient doing well.  Patient comes for surveillance colonoscopy with h/o polyps, with family h/o colon cancer (father, uncles, grandfather)  as per patient request will do at the beginning of .  Colonoscopy showed internal hemorrhoids,  multiple adenomas will repeat colonoscopy in one year.  14: comes for surveillance colonoscopy  will do colonoscopy. patient with reflux and epigastric pain will do EGD to evaluate and r/o gastritis, esophagitis.  Will continue PPI.  : patient had colonoscopy with evidence of adenomas and hyperplastic polyps will need a repeat colonoscopy in 1 years.  Patient with strong family h/o colon cancer.  EGD does showed gastritis negative for H pylori no evidence of Jo's. Discussed the need for appropriate follow-up care.  Patient will be placed in our recall system and a letter will be mailed to the patient.   3/15 Patient is here for his screen colonoscopy, stated has history of multiple polyps and family history of colon cancer, stated has acid reflux, epigastric pain, and slow digestion and nausea. Plan EGD, Colonoscopy, Gastric Emptying study. Zofran, Ranitidine Dexilant. RUQ. 6/15: patient had ultrasound that showed fatty liver and renal cysts, GES is normal EGD and colonoscopy showed  hiatal hernia , gastritis negative for h pylori and colonoscopy showed polyps, hyperplastic and adenoma will repeat colonoscopy in 3 years.  Patient with bloating and abdominal distension. Will start proctitis, will need to do diet. 10/15: Patient with abdominal discomfort with improvement with probiotics, (florastor) but the insurance does not cover the probiotics.  Will try another probiotics and will continue with PPI. : Patient went to [place] with epigastric pain, with reflux will continue PPI and will consider EGD to r/o PUD/ h pylori status. Patient with change on bowel habit  and LLQ pain, with some improvement of the diarrhea and now constipation, will plan colonoscopy.  In the last 3 months patient had another family member his aunt with colon cancer. : Patient had EGD and colonoscopy with evidence of  hiatal hernia gastritis negative for h pylori, colonoscopy with hemorrhoids and polyps adenoma (3 in descending colon) Will do surveillance colonoscopy in 3 years. Will try in 2 years as per patient request.  3/17:  Patient comes with discomfort in his left abdomen and increase in gas, will restart probiotics, will continue with dexilant. Patient with personal h/o colonic polyps with multiples family members with colon cancer, found out recently of 2 more familiars with colon cancer and he is afraid  because of his high risk.  Will plan colonoscopy this summer.  :  Patient persist with bloating will do probiotics, insurance did not cover the treatment, will give sample.  Patient will need colonoscopy and EGD patient with trip to [place] will plan endoscopic studies in 2017.   : patient had EGD and colonoscopy with evidence of hiatal hernia, and gastritis negative for h pylori, colonoscopy showed a 8 mm polyp at the transverse colon and 2 more polyps in the area smaller 5 mm in diameter. Internal hemorrhoids and 3 small polyps at the descending colon. All the polyps are tubular adenoma will repeat colonoscopy in 3 years. Patient with large scar in the mid abdomen with discomfort, and ventral hernia.  :  Patient comes with persistent symptom of discomfort in the abdomen, no evidence of obstruction, with bloating and gas, was treated with probiotics with some improvement. WIll CT scan of abdomen and pelvis to r/o hernias that could be causing partial obstruction. and will start IB diaz, will follow in 2 months. : Patient had CT scan with no pathology that could explain his symptoms. patient with bloating and fullness, with symptoms of slow digestion, and gas. Will start pancreatic enzymes, no improvement with probiotics. Previous GES was normal. Will start trial with creon and will follow in 2 months, If treatment do not improved the symptoms will consider short treatment with antibiotics follow by probiotics.  Patient said he is doing well with Creon once at day after his lunch. Patient is here requiring to have his colonoscopy done, he has medical history of multiples adenoma polyps in the last 3 years, also multiples family members with CRC ( Mainly aunts and uncles ).  Plan: Patient will continue with Creon and also will have surveillance colonoscopy.  Patient said he is doing well with Creon once at day after his lunch. also requesting Dexilant prescription due to reflux and gastritis symptoms at times. Colonoscopy with evidence of one tubular adenoma polyp, and internal hemorrhoids. Patient will do diet and continue with Dexilant and Creon, side effect of PPI were discussed with him. : Patient comes with abdominal pain, will plan EGD and patient with strong family h/o colon cancer wants to have a surveillance colonoscopy.  Will plan EGD and colonoscopy. Patient had a recent trip to [place] and start having dyspepsia, also found out that another cousin had colon cancer at age 60 and she was from her mother side, other were related to his father.  10/21: Patient had in 2020 EGD and colonoscopy with evidence of normal duodenum, gastritis negative for H pylori, distal esophageal reflux negative for Jo's esophagus. Colonoscopy showed tubular adenoma in the transverse colon ( 5 mm) will need repeat colonoscopy in 5 years ad internal hemorrhoids. Patient went to [facility] and since has epigastric pain  and bloating. Will do trial with PPI and will do EGD and will plan colonoscopy. Patient states that other cousin  of colon cancer in [place]. I tried to explain to him the last findings of his colonoscopy, patient states now has constipation and rare rectal bleeding. Will plan EGD and colonoscopy.  : Patient had EGD and colonoscopy with evidence of normal duodenum, gastritis negative for H pylori, fundic gland, distal esophageal reflux, with carditis, negative for Intestinal Metaplasia. Cecal Polyp ( tubular adenoma) and internal hemorrhoids, normal rectal mucosa. Will plan colonoscopy in 3 years. ; Patient comes for evaluation after oncologist who recommended to repeat endoscopic evaluation for drop in HB and with iron deficiency, will do EGD and colonoscopy.  Patient takes occasional NSAID with black stool and  rare rectal bleeding. 10/23: Patient had upper endoscopy and colonoscopy on  of  with the impression of a normal esophagus, small hiatal hernia, chronic gastritis, normal duodenum.  Colonoscopy showed internal hemorrhoids otherwise normal colon was recommended to repeat a colonoscopy in 5 years because of personal history.  Pathology report had peptic duodenitis, chronic gastritis changes related to proton pump inhibitor, negative for H. pylori, dysplasia or malignancy, distal esophagus which is well mucosa without significant normality, no Jo's esophagus, dysplasia or malignancy.  With this finding we will try to control acid production, with a lower dose of PPI.  We will continue diet and will follow-up as needed basis.Will need to avoid use of NSAID.

## 2023-10-11 NOTE — PHYSICAL EXAM CONSTITUTIONAL:
well developed, obese,  well nourished , in no acute distress , ambulating without difficulty , normal communication ability

## 2023-10-17 ENCOUNTER — FOLLOW UP (OUTPATIENT)
Dept: URBAN - METROPOLITAN AREA CLINIC 26 | Facility: CLINIC | Age: 73
End: 2023-10-17

## 2023-10-17 DIAGNOSIS — D31.31: ICD-10-CM

## 2023-10-17 DIAGNOSIS — H35.3131: ICD-10-CM

## 2023-10-17 DIAGNOSIS — H47.092: ICD-10-CM

## 2023-10-17 DIAGNOSIS — H33.102: ICD-10-CM

## 2023-10-17 DIAGNOSIS — H43.813: ICD-10-CM

## 2023-10-17 DIAGNOSIS — H31.092: ICD-10-CM

## 2023-10-17 DIAGNOSIS — H43.393: ICD-10-CM

## 2023-10-17 DIAGNOSIS — H04.123: ICD-10-CM

## 2023-10-17 DIAGNOSIS — H35.372: ICD-10-CM

## 2023-10-17 PROCEDURE — 92014 COMPRE OPH EXAM EST PT 1/>: CPT

## 2023-10-17 PROCEDURE — 92250 FUNDUS PHOTOGRAPHY W/I&R: CPT | Mod: 59

## 2023-10-17 PROCEDURE — 92134 CPTRZ OPH DX IMG PST SGM RTA: CPT

## 2023-10-17 ASSESSMENT — TONOMETRY
OD_IOP_MMHG: 13
OS_IOP_MMHG: 12

## 2023-10-17 ASSESSMENT — VISUAL ACUITY
OD_SC: 20/25+2
OS_SC: 20/40-1

## 2024-01-01 ENCOUNTER — ERX REFILL RESPONSE (OUTPATIENT)
Dept: URBAN - METROPOLITAN AREA CLINIC 63 | Facility: CLINIC | Age: 74
End: 2024-01-01

## 2024-01-01 RX ORDER — PANCRELIPASE 36000; 180000; 114000 [USP'U]/1; [USP'U]/1; [USP'U]/1
TAKE 2 CAPSULES BY MOUTH TWO TIMES A DAY WITH EACH MEAL CAPSULE, DELAYED RELEASE PELLETS ORAL
Qty: 60 CAPSULE | Refills: 0 | OUTPATIENT

## 2024-01-01 RX ORDER — PANCRELIPASE 36000; 180000; 114000 [USP'U]/1; [USP'U]/1; [USP'U]/1
TAKE 2 CAPSULE TWICE A DAY WITH MEALS CAPSULE, DELAYED RELEASE PELLETS ORAL
Qty: 120 | Refills: 2 | OUTPATIENT

## 2024-01-17 ENCOUNTER — FOLLOW UP (OUTPATIENT)
Dept: URBAN - METROPOLITAN AREA CLINIC 26 | Facility: CLINIC | Age: 74
End: 2024-01-17

## 2024-01-17 DIAGNOSIS — Z96.1: ICD-10-CM

## 2024-01-17 DIAGNOSIS — H43.813: ICD-10-CM

## 2024-01-17 DIAGNOSIS — H35.372: ICD-10-CM

## 2024-01-17 DIAGNOSIS — H02.833: ICD-10-CM

## 2024-01-17 DIAGNOSIS — H33.102: ICD-10-CM

## 2024-01-17 DIAGNOSIS — H47.092: ICD-10-CM

## 2024-01-17 DIAGNOSIS — H02.836: ICD-10-CM

## 2024-01-17 DIAGNOSIS — H10.13: ICD-10-CM

## 2024-01-17 DIAGNOSIS — H31.092: ICD-10-CM

## 2024-01-17 DIAGNOSIS — H43.393: ICD-10-CM

## 2024-01-17 DIAGNOSIS — D31.31: ICD-10-CM

## 2024-01-17 DIAGNOSIS — H04.123: ICD-10-CM

## 2024-01-17 DIAGNOSIS — H35.3131: ICD-10-CM

## 2024-01-17 PROCEDURE — 92134 CPTRZ OPH DX IMG PST SGM RTA: CPT

## 2024-01-17 PROCEDURE — 92250 FUNDUS PHOTOGRAPHY W/I&R: CPT

## 2024-01-17 PROCEDURE — 99213 OFFICE O/P EST LOW 20 MIN: CPT

## 2024-01-17 RX ORDER — IBUPROFEN 600 MG/1: 1 TABLET ORAL TWICE A DAY

## 2024-01-17 ASSESSMENT — VISUAL ACUITY
OD_SC: 20/25-1
OS_SC: 20/40

## 2024-01-17 ASSESSMENT — TONOMETRY
OS_IOP_MMHG: 16
OD_IOP_MMHG: 16

## 2024-02-13 ENCOUNTER — OV EP (OUTPATIENT)
Dept: URBAN - METROPOLITAN AREA CLINIC 63 | Facility: CLINIC | Age: 74
End: 2024-02-13
Payer: MEDICARE

## 2024-02-13 ENCOUNTER — LAB (OUTPATIENT)
Dept: URBAN - METROPOLITAN AREA CLINIC 63 | Facility: CLINIC | Age: 74
End: 2024-02-13

## 2024-02-13 VITALS
RESPIRATION RATE: 20 BRPM | HEART RATE: 106 BPM | OXYGEN SATURATION: 95 % | WEIGHT: 233 LBS | DIASTOLIC BLOOD PRESSURE: 80 MMHG | HEIGHT: 66 IN | SYSTOLIC BLOOD PRESSURE: 130 MMHG | TEMPERATURE: 97.7 F | BODY MASS INDEX: 37.45 KG/M2

## 2024-02-13 DIAGNOSIS — Z80.0 FAMILY HISTORY OF MALIGNANT NEOPLASM OF DIGESTIVE ORGANS: ICD-10-CM

## 2024-02-13 DIAGNOSIS — D50.0 IRON DEFICIENCY ANEMIA DUE TO CHRONIC BLOOD LOSS: ICD-10-CM

## 2024-02-13 DIAGNOSIS — K64.1 GRADE II HEMORRHOIDS: ICD-10-CM

## 2024-02-13 DIAGNOSIS — K21.00 ESOPHAGITIS, REFLUX: ICD-10-CM

## 2024-02-13 PROCEDURE — 99214 OFFICE O/P EST MOD 30 MIN: CPT | Performed by: INTERNAL MEDICINE

## 2024-02-13 RX ORDER — ALBUTEROL SULFATE 0.63 MG/3ML
SOLUTION RESPIRATORY (INHALATION) ONCE A DAY
Refills: 0 | Status: ACTIVE | COMMUNITY
Start: 2022-01-12

## 2024-02-13 RX ORDER — PANCRELIPASE 36000; 180000; 114000 [USP'U]/1; [USP'U]/1; [USP'U]/1
TAKE 2 CAPSULES BY MOUTH TWO TIMES A DAY WITH EACH MEAL CAPSULE, DELAYED RELEASE PELLETS ORAL
Qty: 60 CAPSULE | Refills: 0 | Status: ACTIVE | COMMUNITY

## 2024-02-13 RX ORDER — ALBUTEROL SULFATE 2.5 MG/3ML
SOLUTION RESPIRATORY (INHALATION)
Refills: 0 | Status: ACTIVE | COMMUNITY
Start: 2010-12-17

## 2024-02-13 RX ORDER — ROSUVASTATIN CALCIUM 20 MG/1
TABLET, FILM COATED ORAL ONCE A DAY
Refills: 0 | Status: ACTIVE | COMMUNITY
Start: 2022-01-12

## 2024-02-13 RX ORDER — FUROSEMIDE 40 MG/1
TABLET ORAL ONCE A DAY
Refills: 0 | Status: ACTIVE | COMMUNITY
Start: 2022-01-12

## 2024-02-13 RX ORDER — IBUPROFEN 800 MG/1
TABLET, FILM COATED ORAL
Refills: 0 | Status: ACTIVE | COMMUNITY
Start: 2010-12-17

## 2024-02-13 RX ORDER — AMLODIPINE BESYLATE 5 MG/1
TABLET ORAL
Refills: 0 | Status: ACTIVE | COMMUNITY
Start: 2010-12-17

## 2024-02-13 RX ORDER — RAMIPRIL 2.5 MG/1
CAPSULE ORAL ONCE A DAY
Refills: 0 | Status: ACTIVE | COMMUNITY
Start: 2022-01-12

## 2024-02-13 RX ORDER — PREDNISONE 10 MG/1
1 BID FOR 1 WEEK THEN ONE A DAY UNTIL GONE TABLET ORAL
Refills: 0 | Status: ACTIVE | COMMUNITY
Start: 2011-03-30

## 2024-02-13 RX ORDER — MONTELUKAST SODIUM 10 MG/1
TABLET, FILM COATED ORAL ONCE A DAY
Refills: 0 | Status: ACTIVE | COMMUNITY
Start: 2022-01-12

## 2024-02-13 RX ORDER — IPRATROPIUM BROMIDE 0.5 MG/2.5ML
SOLUTION RESPIRATORY (INHALATION) ONCE A DAY
Refills: 0 | Status: ACTIVE | COMMUNITY
Start: 2022-01-12

## 2024-02-13 RX ORDER — HYDROCORTISONE 25 MG/G
QD CREAM TOPICAL
Refills: 11 | Status: ACTIVE | COMMUNITY
Start: 2010-03-08

## 2024-02-13 RX ORDER — TIOTROPIUM BROMIDE 18 UG/1
CAPSULE ORAL; RESPIRATORY (INHALATION) ONCE A DAY
Refills: 0 | Status: ACTIVE | COMMUNITY
Start: 2022-01-12

## 2024-02-13 RX ORDER — METOPROLOL SUCCINATE 50 MG/1
1 TABLET TABLET, FILM COATED, EXTENDED RELEASE ORAL ONCE A DAY
Refills: 0 | Status: ACTIVE | COMMUNITY
Start: 2022-01-12

## 2024-02-13 RX ORDER — NITROGLYCERIN 0.4 MG/1
TABLET SUBLINGUAL ONCE A DAY
Refills: 0 | Status: ACTIVE | COMMUNITY
Start: 2022-01-12

## 2024-02-13 RX ORDER — KETOCONAZOLE 20 MG/G
CREAM TOPICAL ONCE A DAY
Refills: 0 | Status: ACTIVE | COMMUNITY
Start: 2022-01-12

## 2024-02-13 RX ORDER — DEXLANSOPRAZOLE 60 MG/1
1 CAPSULE CAPSULE, DELAYED RELEASE ORAL ONCE A DAY
Qty: 30 CAPSULE | Refills: 3 | Status: ACTIVE | COMMUNITY
Start: 2022-01-12

## 2024-02-13 RX ORDER — ISOSORBIDE MONONITRATE 30 MG/1
TABLET, EXTENDED RELEASE ORAL ONCE A DAY
Refills: 0 | Status: ACTIVE | COMMUNITY
Start: 2022-01-12

## 2024-02-13 NOTE — HPI-TODAY'S VISIT:
He finished the Prednisone and the LLQ pain has totally resolved. He continues with loose stools about 4-5 times a day. He is also having some pain around the area of the previous hernia repair.  Patient with chronic diarrhea with no evidence of colitis or  celiac disease plan was to start lomotil to control the diarrhea.  Patient had colonoscopy with adenoma polyp removed will need colonoscopy surveillance in 5 years.  Patient had some constipation and cholestyramine was stopped, patient doing well.  Patient comes for surveillance colonoscopy with h/o polyps, with family h/o colon cancer (father, uncles, grandfather)  as per patient request will do at the beginning of .  Colonoscopy showed internal hemorrhoids,  multiple adenomas will repeat colonoscopy in one year.  14: comes for surveillance colonoscopy  will do colonoscopy. patient with reflux and epigastric pain will do EGD to evaluate and r/o gastritis, esophagitis.  Will continue PPI.  : patient had colonoscopy with evidence of adenomas and hyperplastic polyps will need a repeat colonoscopy in 1 years.  Patient with strong family h/o colon cancer.  EGD does showed gastritis negative for H pylori no evidence of Jo's. Discussed the need for appropriate follow-up care.  Patient will be placed in our recall system and a letter will be mailed to the patient.   3/15 Patient is here for his screen colonoscopy, stated has history of multiple polyps and family history of colon cancer, stated has acid reflux, epigastric pain, and slow digestion and nausea. Plan EGD, Colonoscopy, Gastric Emptying study. Zofran, Ranitidine Dexilant. RUQ. 6/15: patient had ultrasound that showed fatty liver and renal cysts, GES is normal EGD and colonoscopy showed  hiatal hernia , gastritis negative for h pylori and colonoscopy showed polyps, hyperplastic and adenoma will repeat colonoscopy in 3 years.  Patient with bloating and abdominal distension. Will start proctitis, will need to do diet. 10/15: Patient with abdominal discomfort with improvement with probiotics, (florastor) but the insurance does not cover the probiotics.  Will try another probiotics and will continue with PPI. : Patient went to [place] with epigastric pain, with reflux will continue PPI and will consider EGD to r/o PUD/ h pylori status. Patient with change on bowel habit  and LLQ pain, with some improvement of the diarrhea and now constipation, will plan colonoscopy.  In the last 3 months patient had another family member his aunt with colon cancer. : Patient had EGD and colonoscopy with evidence of  hiatal hernia gastritis negative for h pylori, colonoscopy with hemorrhoids and polyps adenoma (3 in descending colon) Will do surveillance colonoscopy in 3 years. Will try in 2 years as per patient request.  3/17:  Patient comes with discomfort in his left abdomen and increase in gas, will restart probiotics, will continue with dexilant. Patient with personal h/o colonic polyps with multiples family members with colon cancer, found out recently of 2 more familiars with colon cancer and he is afraid  because of his high risk.  Will plan colonoscopy this summer.  :  Patient persist with bloating will do probiotics, insurance did not cover the treatment, will give sample.  Patient will need colonoscopy and EGD patient with trip to [place] will plan endoscopic studies in 2017.   : patient had EGD and colonoscopy with evidence of hiatal hernia, and gastritis negative for h pylori, colonoscopy showed a 8 mm polyp at the transverse colon and 2 more polyps in the area smaller 5 mm in diameter. Internal hemorrhoids and 3 small polyps at the descending colon. All the polyps are tubular adenoma will repeat colonoscopy in 3 years. Patient with large scar in the mid abdomen with discomfort, and ventral hernia.  :  Patient comes with persistent symptom of discomfort in the abdomen, no evidence of obstruction, with bloating and gas, was treated with probiotics with some improvement. WIll CT scan of abdomen and pelvis to r/o hernias that could be causing partial obstruction. and will start IB diaz, will follow in 2 months. : Patient had CT scan with no pathology that could explain his symptoms. patient with bloating and fullness, with symptoms of slow digestion, and gas. Will start pancreatic enzymes, no improvement with probiotics. Previous GES was normal. Will start trial with creon and will follow in 2 months, If treatment do not improved the symptoms will consider short treatment with antibiotics follow by probiotics.  Patient said he is doing well with Creon once at day after his lunch. Patient is here requiring to have his colonoscopy done, he has medical history of multiples adenoma polyps in the last 3 years, also multiples family members with CRC ( Mainly aunts and uncles ).  Plan: Patient will continue with Creon and also will have surveillance colonoscopy.  Patient said he is doing well with Creon once at day after his lunch. also requesting Dexilant prescription due to reflux and gastritis symptoms at times. Colonoscopy with evidence of one tubular adenoma polyp, and internal hemorrhoids. Patient will do diet and continue with Dexilant and Creon, side effect of PPI were discussed with him. : Patient comes with abdominal pain, will plan EGD and patient with strong family h/o colon cancer wants to have a surveillance colonoscopy.  Will plan EGD and colonoscopy. Patient had a recent trip to [place] and start having dyspepsia, also found out that another cousin had colon cancer at age 60 and she was from her mother side, other were related to his father.  10/21: Patient had in 2020 EGD and colonoscopy with evidence of normal duodenum, gastritis negative for H pylori, distal esophageal reflux negative for Jo's esophagus. Colonoscopy showed tubular adenoma in the transverse colon ( 5 mm) will need repeat colonoscopy in 5 years ad internal hemorrhoids. Patient went to [facility] and since has epigastric pain  and bloating. Will do trial with PPI and will do EGD and will plan colonoscopy. Patient states that other cousin  of colon cancer in [place]. I tried to explain to him the last findings of his colonoscopy, patient states now has constipation and rare rectal bleeding. Will plan EGD and colonoscopy.  : Patient had EGD and colonoscopy with evidence of normal duodenum, gastritis negative for H pylori, fundic gland, distal esophageal reflux, with carditis, negative for Intestinal Metaplasia. Cecal Polyp ( tubular adenoma) and internal hemorrhoids, normal rectal mucosa. Will plan colonoscopy in 3 years. ; Patient comes for evaluation after oncologist who recommended to repeat endoscopic evaluation for drop in HB and with iron deficiency, will do EGD and colonoscopy.  Patient takes occasional NSAID with black stool and  rare rectal bleeding. 10/23: Patient had upper endoscopy and colonoscopy on 2023 with the impression of a normal esophagus, small hiatal hernia, chronic gastritis, normal duodenum.  Colonoscopy showed internal hemorrhoids otherwise normal colon was recommended to repeat a colonoscopy in 5 years because of personal history.  Pathology report had peptic duodenitis, chronic gastritis changes related to proton pump inhibitor, negative for H. pylori, dysplasia or malignancy, distal esophagus which is well mucosa without significant normality, no Jo's esophagus, dysplasia or malignancy.  With this finding we will try to control acid production, with a lower dose of PPI.  We will continue diet and will follow-up as needed basis.Will need to avoid use of NSAID. : Patient is evidence of January of this year had an ultrasound of the abdomen with the impression of interval decrease in size of the bilateral renal cyst, the echogenic liver suggestive of hepatic steatosis and no acute abnormality.  With this finding patient will have a FibroScan, will consider liver function labs.  And lipid panel.  Patient with history of peptic duodenitis and dyspepsia on treatment with diet and medication. Patient needs to do diet and weight loss. I spoke with his PCP , she already order the fibroscan and labs results will be sent to us. Will follow in 4 months.

## 2024-04-29 ENCOUNTER — OV EP (OUTPATIENT)
Dept: URBAN - METROPOLITAN AREA CLINIC 63 | Facility: CLINIC | Age: 74
End: 2024-04-29
Payer: MEDICARE

## 2024-04-29 VITALS
BODY MASS INDEX: 37.28 KG/M2 | HEART RATE: 90 BPM | OXYGEN SATURATION: 96 % | SYSTOLIC BLOOD PRESSURE: 133 MMHG | WEIGHT: 232 LBS | DIASTOLIC BLOOD PRESSURE: 78 MMHG | TEMPERATURE: 97.3 F | HEIGHT: 66 IN

## 2024-04-29 DIAGNOSIS — K21.9 GASTROESOPHAGEAL REFLUX DISEASE: ICD-10-CM

## 2024-04-29 DIAGNOSIS — D50.9 ANEMIA, IRON DEFICIENCY: ICD-10-CM

## 2024-04-29 DIAGNOSIS — R14.0 BLOATING: ICD-10-CM

## 2024-04-29 PROCEDURE — 99214 OFFICE O/P EST MOD 30 MIN: CPT | Performed by: INTERNAL MEDICINE

## 2024-04-29 RX ORDER — PREDNISONE 10 MG/1
1 BID FOR 1 WEEK THEN ONE A DAY UNTIL GONE TABLET ORAL
Refills: 0 | Status: ACTIVE | COMMUNITY
Start: 2011-03-30

## 2024-04-29 RX ORDER — NITROGLYCERIN 0.4 MG/1
TABLET SUBLINGUAL ONCE A DAY
Refills: 0 | Status: ACTIVE | COMMUNITY
Start: 2022-01-12

## 2024-04-29 RX ORDER — TIOTROPIUM BROMIDE 18 UG/1
CAPSULE ORAL; RESPIRATORY (INHALATION) ONCE A DAY
Refills: 0 | Status: ACTIVE | COMMUNITY
Start: 2022-01-12

## 2024-04-29 RX ORDER — HYDROCORTISONE 25 MG/G
QD CREAM TOPICAL
Refills: 11 | Status: ACTIVE | COMMUNITY
Start: 2010-03-08

## 2024-04-29 RX ORDER — IPRATROPIUM BROMIDE 0.5 MG/2.5ML
SOLUTION RESPIRATORY (INHALATION) ONCE A DAY
Refills: 0 | Status: ACTIVE | COMMUNITY
Start: 2022-01-12

## 2024-04-29 RX ORDER — OMEPRAZOLE 40 MG/1
1 CAPSULE 30 MINUTES BEFORE MORNING MEAL CAPSULE, DELAYED RELEASE ORAL TWICE A DAY
Qty: 180 | Refills: 1 | OUTPATIENT
Start: 2024-02-16

## 2024-04-29 RX ORDER — ROSUVASTATIN CALCIUM 20 MG/1
TABLET, FILM COATED ORAL ONCE A DAY
Refills: 0 | Status: ACTIVE | COMMUNITY
Start: 2022-01-12

## 2024-04-29 RX ORDER — FUROSEMIDE 40 MG/1
TABLET ORAL ONCE A DAY
Refills: 0 | Status: ACTIVE | COMMUNITY
Start: 2022-01-12

## 2024-04-29 RX ORDER — PANCRELIPASE 36000; 180000; 114000 [USP'U]/1; [USP'U]/1; [USP'U]/1
TAKE 2 CAPSULES BY MOUTH TWO TIMES A DAY WITH EACH MEAL CAPSULE, DELAYED RELEASE PELLETS ORAL TWICE A DAY
Qty: 60 | Refills: 1 | Status: ACTIVE | COMMUNITY
End: 2024-05-02

## 2024-04-29 RX ORDER — ALBUTEROL SULFATE 0.63 MG/3ML
SOLUTION RESPIRATORY (INHALATION) ONCE A DAY
Refills: 0 | Status: ACTIVE | COMMUNITY
Start: 2022-01-12

## 2024-04-29 RX ORDER — MONTELUKAST SODIUM 10 MG/1
TABLET, FILM COATED ORAL ONCE A DAY
Refills: 0 | Status: ACTIVE | COMMUNITY
Start: 2022-01-12

## 2024-04-29 RX ORDER — ALBUTEROL SULFATE 2.5 MG/3ML
SOLUTION RESPIRATORY (INHALATION)
Refills: 0 | Status: ACTIVE | COMMUNITY
Start: 2010-12-17

## 2024-04-29 RX ORDER — KETOCONAZOLE 20 MG/G
CREAM TOPICAL ONCE A DAY
Refills: 0 | Status: ACTIVE | COMMUNITY
Start: 2022-01-12

## 2024-04-29 RX ORDER — ISOSORBIDE MONONITRATE 30 MG/1
TABLET, EXTENDED RELEASE ORAL ONCE A DAY
Refills: 0 | Status: ACTIVE | COMMUNITY
Start: 2022-01-12

## 2024-04-29 RX ORDER — OMEPRAZOLE 40 MG/1
1 CAPSULE 30 MINUTES BEFORE MORNING MEAL CAPSULE, DELAYED RELEASE ORAL ONCE A DAY
Qty: 30 | Status: ACTIVE | COMMUNITY
Start: 2024-02-16

## 2024-04-29 RX ORDER — AMLODIPINE BESYLATE 5 MG/1
TABLET ORAL
Refills: 0 | Status: ACTIVE | COMMUNITY
Start: 2010-12-17

## 2024-04-29 RX ORDER — IBUPROFEN 800 MG/1
TABLET, FILM COATED ORAL
Refills: 0 | Status: ACTIVE | COMMUNITY
Start: 2010-12-17

## 2024-04-29 RX ORDER — RAMIPRIL 2.5 MG/1
CAPSULE ORAL ONCE A DAY
Refills: 0 | Status: ACTIVE | COMMUNITY
Start: 2022-01-12

## 2024-04-29 RX ORDER — METOPROLOL SUCCINATE 50 MG/1
1 TABLET TABLET, FILM COATED, EXTENDED RELEASE ORAL ONCE A DAY
Refills: 0 | Status: ACTIVE | COMMUNITY
Start: 2022-01-12

## 2024-04-29 RX ORDER — DEXLANSOPRAZOLE 60 MG/1
TAKE 1 CAPSULE BY MOUTH EVERY DAY CAPSULE, DELAYED RELEASE ORAL
Qty: 30 CAPSULE | Refills: 3 | Status: ACTIVE | COMMUNITY

## 2024-04-29 NOTE — PHYSICAL EXAM GASTROINTESTINAL
Abdomen , soft, nontender, nondistended , no guarding or rigidity , no masses palpable , normal bowel sounds , Liver and Spleen,  no hepatosplenomegaly , liver nontender Erin Nagel is a 57 year old female presenting for a 6 month follow up.    Denies known Latex allergy or symptoms of Latex sensitivity.    Medications reviewed and updated.    Health Maintenance Due   Topic Date Due   • Lung Cancer Screening  Never done   • Shingles Vaccine (1 of 2) Never done   • DTaP/Tdap/Td Vaccine (2 - Td or Tdap) 03/13/2023   • Diabetes Foot Exam  07/13/2023   • Colorectal Cancer Risk - Colonoscopy  07/27/2023       Patient is due for topics listed above, she wishes to proceed with Colorectal Cancer Screening: Colonoscopy and Diabetes Foot Exam, but is not proceeding with Immunization(s) Dtap/Tdap/Td and Shingles and Lung Cancer Screening at this time.

## 2024-04-29 NOTE — HPI-TODAY'S VISIT:
He finished the Prednisone and the LLQ pain has totally resolved. He continues with loose stools about 4-5 times a day. He is also having some pain around the area of the previous hernia repair.  Patient with chronic diarrhea with no evidence of colitis or  celiac disease plan was to start lomotil to control the diarrhea.  Patient had colonoscopy with adenoma polyp removed will need colonoscopy surveillance in 5 years.  Patient had some constipation and cholestyramine was stopped, patient doing well.  Patient comes for surveillance colonoscopy with h/o polyps, with family h/o colon cancer (father, uncles, grandfather)  as per patient request will do at the beginning of .  Colonoscopy showed internal hemorrhoids,  multiple adenomas will repeat colonoscopy in one year.  14: comes for surveillance colonoscopy  will do colonoscopy. patient with reflux and epigastric pain will do EGD to evaluate and r/o gastritis, esophagitis.  Will continue PPI.  : patient had colonoscopy with evidence of adenomas and hyperplastic polyps will need a repeat colonoscopy in 1 years.  Patient with strong family h/o colon cancer.  EGD does showed gastritis negative for H pylori no evidence of Jo's. Discussed the need for appropriate follow-up care.  Patient will be placed in our recall system and a letter will be mailed to the patient.   3/15 Patient is here for his screen colonoscopy, stated has history of multiple polyps and family history of colon cancer, stated has acid reflux, epigastric pain, and slow digestion and nausea. Plan EGD, Colonoscopy, Gastric Emptying study. Zofran, Ranitidine Dexilant. RUQ. 6/15: patient had ultrasound that showed fatty liver and renal cysts, GES is normal EGD and colonoscopy showed  hiatal hernia , gastritis negative for h pylori and colonoscopy showed polyps, hyperplastic and adenoma will repeat colonoscopy in 3 years.  Patient with bloating and abdominal distension. Will start proctitis, will need to do diet. 10/15: Patient with abdominal discomfort with improvement with probiotics, (florastor) but the insurance does not cover the probiotics.  Will try another probiotics and will continue with PPI. : Patient went to [place] with epigastric pain, with reflux will continue PPI and will consider EGD to r/o PUD/ h pylori status. Patient with change on bowel habit  and LLQ pain, with some improvement of the diarrhea and now constipation, will plan colonoscopy.  In the last 3 months patient had another family member his aunt with colon cancer. : Patient had EGD and colonoscopy with evidence of  hiatal hernia gastritis negative for h pylori, colonoscopy with hemorrhoids and polyps adenoma (3 in descending colon) Will do surveillance colonoscopy in 3 years. Will try in 2 years as per patient request.  3/17:  Patient comes with discomfort in his left abdomen and increase in gas, will restart probiotics, will continue with dexilant. Patient with personal h/o colonic polyps with multiples family members with colon cancer, found out recently of 2 more familiars with colon cancer and he is afraid  because of his high risk.  Will plan colonoscopy this summer.  :  Patient persist with bloating will do probiotics, insurance did not cover the treatment, will give sample.  Patient will need colonoscopy and EGD patient with trip to [place] will plan endoscopic studies in 2017.   : patient had EGD and colonoscopy with evidence of hiatal hernia, and gastritis negative for h pylori, colonoscopy showed a 8 mm polyp at the transverse colon and 2 more polyps in the area smaller 5 mm in diameter. Internal hemorrhoids and 3 small polyps at the descending colon. All the polyps are tubular adenoma will repeat colonoscopy in 3 years. Patient with large scar in the mid abdomen with discomfort, and ventral hernia.  :  Patient comes with persistent symptom of discomfort in the abdomen, no evidence of obstruction, with bloating and gas, was treated with probiotics with some improvement. WIll CT scan of abdomen and pelvis to r/o hernias that could be causing partial obstruction. and will start IB diaz, will follow in 2 months. : Patient had CT scan with no pathology that could explain his symptoms. patient with bloating and fullness, with symptoms of slow digestion, and gas. Will start pancreatic enzymes, no improvement with probiotics. Previous GES was normal. Will start trial with creon and will follow in 2 months, If treatment do not improved the symptoms will consider short treatment with antibiotics follow by probiotics.  Patient said he is doing well with Creon once at day after his lunch. Patient is here requiring to have his colonoscopy done, he has medical history of multiples adenoma polyps in the last 3 years, also multiples family members with CRC ( Mainly aunts and uncles ).  Plan: Patient will continue with Creon and also will have surveillance colonoscopy.  Patient said he is doing well with Creon once at day after his lunch. also requesting Dexilant prescription due to reflux and gastritis symptoms at times. Colonoscopy with evidence of one tubular adenoma polyp, and internal hemorrhoids. Patient will do diet and continue with Dexilant and Creon, side effect of PPI were discussed with him. : Patient comes with abdominal pain, will plan EGD and patient with strong family h/o colon cancer wants to have a surveillance colonoscopy.  Will plan EGD and colonoscopy. Patient had a recent trip to [place] and start having dyspepsia, also found out that another cousin had colon cancer at age 60 and she was from her mother side, other were related to his father.  10/21: Patient had in 2020 EGD and colonoscopy with evidence of normal duodenum, gastritis negative for H pylori, distal esophageal reflux negative for Jo's esophagus. Colonoscopy showed tubular adenoma in the transverse colon ( 5 mm) will need repeat colonoscopy in 5 years ad internal hemorrhoids. Patient went to [facility] and since has epigastric pain  and bloating. Will do trial with PPI and will do EGD and will plan colonoscopy. Patient states that other cousin  of colon cancer in [place]. I tried to explain to him the last findings of his colonoscopy, patient states now has constipation and rare rectal bleeding. Will plan EGD and colonoscopy.  : Patient had EGD and colonoscopy with evidence of normal duodenum, gastritis negative for H pylori, fundic gland, distal esophageal reflux, with carditis, negative for Intestinal Metaplasia. Cecal Polyp ( tubular adenoma) and internal hemorrhoids, normal rectal mucosa. Will plan colonoscopy in 3 years. ; Patient comes for evaluation after oncologist who recommended to repeat endoscopic evaluation for drop in HB and with iron deficiency, will do EGD and colonoscopy.  Patient takes occasional NSAID with black stool and  rare rectal bleeding. 10/23: Patient had upper endoscopy and colonoscopy on 2023 with the impression of a normal esophagus, small hiatal hernia, chronic gastritis, normal duodenum.  Colonoscopy showed internal hemorrhoids otherwise normal colon was recommended to repeat a colonoscopy in 5 years because of personal history.  Pathology report had peptic duodenitis, chronic gastritis changes related to proton pump inhibitor, negative for H. pylori, dysplasia or malignancy, distal esophagus which is well mucosa without significant normality, no Jo's esophagus, dysplasia or malignancy.  With this finding we will try to control acid production, with a lower dose of PPI.  We will continue diet and will follow-up as needed basis.Will need to avoid use of NSAID. : Patient is evidence of January of this year had an ultrasound of the abdomen with the impression of interval decrease in size of the bilateral renal cyst, the echogenic liver suggestive of hepatic steatosis and no acute abnormality.  With this finding patient will have a FibroScan, will consider liver function labs.  And lipid panel.  Patient with history of peptic duodenitis and dyspepsia on treatment with diet and medication. Patient needs to do diet and weight loss. I spoke with his PCP , she already order the fibroscan and labs results will be sent to us. Will follow in 4 months. : Patient is being seen today at the office.  With personal history of fatty liver, had a FibroScan with evidence of severe steatosis S3 and no clinical significant fibrosis F0.  Recent labs shows an normal CBC with white blood cell count of 5.7 hemoglobin of 13.4, and platelet count of 269.  PSA was ordered that is in the normal range at 0.46.  Labs show normal INR CMP with normal LFTs, and bilirubin.  Does have an elevated TSH of 6.46 with normal free T4 and total T3.  Normal CBC.  Low vitamin D lipid panel with elevated triglycerides 169 total cholesterol of 283, with elevated  hepatitis A antibody is positive.  This could be related to previous infection or vaccination.  At this moment patient will need to continue doing diet weight loss exercise, will need to control the lipid panel that is abnormal.  With previous history of duodenitis patient need to avoid the use of NSAIDs like ibuprofen and will continue with treatment with PPI will try to wean him of after duodenitis is resolved. Patient had with his oncologist panel of iron and persist low iron, could be related to hs duodenintis, will need IV iron supplement. Will consider further w/u Will increase dose of PPI and will do EGD. and colonoscopy if there are negative will do capsule endocopy.

## 2024-05-06 ENCOUNTER — LAB OUTSIDE AN ENCOUNTER (OUTPATIENT)
Dept: URBAN - METROPOLITAN AREA CLINIC 63 | Facility: CLINIC | Age: 74
End: 2024-05-06

## 2024-05-09 ENCOUNTER — OUT OF OFFICE VISIT (OUTPATIENT)
Dept: URBAN - METROPOLITAN AREA SURGERY CENTER 4 | Facility: SURGERY CENTER | Age: 74
End: 2024-05-09
Payer: MEDICARE

## 2024-05-09 ENCOUNTER — CLAIMS CREATED FROM THE CLAIM WINDOW (OUTPATIENT)
Dept: URBAN - METROPOLITAN AREA CLINIC 4 | Facility: CLINIC | Age: 74
End: 2024-05-09
Payer: MEDICARE

## 2024-05-09 DIAGNOSIS — K44.9 DIAPHRAGMATIC HERNIA WITHOUT OBSTRUCTION OR GANGRENE: ICD-10-CM

## 2024-05-09 DIAGNOSIS — D50.9 IRON DEFICIENCY ANEMIA, UNSPECIFIED IRON DEFICIENCY ANEMIA TYPE: ICD-10-CM

## 2024-05-09 DIAGNOSIS — D50.9 ANEMIA, IRON DEFICIENCY: ICD-10-CM

## 2024-05-09 DIAGNOSIS — K29.50 CHRONIC GASTRITIS WITHOUT BLEEDING, UNSPECIFIED GASTRITIS TYPE: ICD-10-CM

## 2024-05-09 DIAGNOSIS — K29.70 GASTRITIS: ICD-10-CM

## 2024-05-09 DIAGNOSIS — K29.70 GASTRITIS, UNSPECIFIED, WITHOUT BLEEDING: ICD-10-CM

## 2024-05-09 DIAGNOSIS — K21.9 GASTRO-ESOPHAGEAL REFLUX DISEASE WITHOUT ESOPHAGITIS: ICD-10-CM

## 2024-05-09 DIAGNOSIS — K64.1 SECOND DEGREE HEMORRHOIDS: ICD-10-CM

## 2024-05-09 DIAGNOSIS — K44.9 HIATAL HERNIA: ICD-10-CM

## 2024-05-09 DIAGNOSIS — K21.9 GASTRIC REFLUX: ICD-10-CM

## 2024-05-09 DIAGNOSIS — K31.89 OTHER DISEASES OF STOMACH AND DUODENUM: ICD-10-CM

## 2024-05-09 PROCEDURE — 45378 DIAGNOSTIC COLONOSCOPY: CPT | Performed by: INTERNAL MEDICINE

## 2024-05-09 PROCEDURE — 88342 IMHCHEM/IMCYTCHM 1ST ANTB: CPT | Performed by: PATHOLOGY

## 2024-05-09 PROCEDURE — 43239 EGD BIOPSY SINGLE/MULTIPLE: CPT | Performed by: INTERNAL MEDICINE

## 2024-05-09 PROCEDURE — 00813 ANES UPR LWR GI NDSC PX: CPT | Performed by: NURSE ANESTHETIST, CERTIFIED REGISTERED

## 2024-05-09 PROCEDURE — 88305 TISSUE EXAM BY PATHOLOGIST: CPT | Performed by: PATHOLOGY

## 2024-05-09 PROCEDURE — 43239 EGD BIOPSY SINGLE/MULTIPLE: CPT | Performed by: CLINIC/CENTER

## 2024-05-09 PROCEDURE — 45378 DIAGNOSTIC COLONOSCOPY: CPT | Performed by: CLINIC/CENTER

## 2024-05-09 RX ORDER — FUROSEMIDE 40 MG/1
TABLET ORAL ONCE A DAY
Refills: 0 | Status: ACTIVE | COMMUNITY
Start: 2022-01-12

## 2024-05-09 RX ORDER — IPRATROPIUM BROMIDE 0.5 MG/2.5ML
SOLUTION RESPIRATORY (INHALATION) ONCE A DAY
Refills: 0 | Status: ACTIVE | COMMUNITY
Start: 2022-01-12

## 2024-05-09 RX ORDER — TIOTROPIUM BROMIDE 18 UG/1
CAPSULE ORAL; RESPIRATORY (INHALATION) ONCE A DAY
Refills: 0 | Status: ACTIVE | COMMUNITY
Start: 2022-01-12

## 2024-05-09 RX ORDER — ROSUVASTATIN CALCIUM 20 MG/1
TABLET, FILM COATED ORAL ONCE A DAY
Refills: 0 | Status: ACTIVE | COMMUNITY
Start: 2022-01-12

## 2024-05-09 RX ORDER — NITROGLYCERIN 0.4 MG/1
TABLET SUBLINGUAL ONCE A DAY
Refills: 0 | Status: ACTIVE | COMMUNITY
Start: 2022-01-12

## 2024-05-09 RX ORDER — MONTELUKAST SODIUM 10 MG/1
TABLET, FILM COATED ORAL ONCE A DAY
Refills: 0 | Status: ACTIVE | COMMUNITY
Start: 2022-01-12

## 2024-05-09 RX ORDER — DEXLANSOPRAZOLE 60 MG/1
TAKE 1 CAPSULE BY MOUTH EVERY DAY CAPSULE, DELAYED RELEASE ORAL
Qty: 30 CAPSULE | Refills: 3 | Status: ACTIVE | COMMUNITY

## 2024-05-09 RX ORDER — METOPROLOL SUCCINATE 50 MG/1
1 TABLET TABLET, FILM COATED, EXTENDED RELEASE ORAL ONCE A DAY
Refills: 0 | Status: ACTIVE | COMMUNITY
Start: 2022-01-12

## 2024-05-09 RX ORDER — KETOCONAZOLE 20 MG/G
CREAM TOPICAL ONCE A DAY
Refills: 0 | Status: ACTIVE | COMMUNITY
Start: 2022-01-12

## 2024-05-09 RX ORDER — ISOSORBIDE MONONITRATE 30 MG/1
TABLET, EXTENDED RELEASE ORAL ONCE A DAY
Refills: 0 | Status: ACTIVE | COMMUNITY
Start: 2022-01-12

## 2024-05-09 RX ORDER — ALBUTEROL SULFATE 0.63 MG/3ML
SOLUTION RESPIRATORY (INHALATION) ONCE A DAY
Refills: 0 | Status: ACTIVE | COMMUNITY
Start: 2022-01-12

## 2024-05-09 RX ORDER — RAMIPRIL 2.5 MG/1
CAPSULE ORAL ONCE A DAY
Refills: 0 | Status: ACTIVE | COMMUNITY
Start: 2022-01-12

## 2024-05-09 RX ORDER — ALBUTEROL SULFATE 2.5 MG/3ML
SOLUTION RESPIRATORY (INHALATION)
Refills: 0 | Status: ACTIVE | COMMUNITY
Start: 2010-12-17

## 2024-05-09 RX ORDER — IBUPROFEN 800 MG/1
TABLET, FILM COATED ORAL
Refills: 0 | Status: ACTIVE | COMMUNITY
Start: 2010-12-17

## 2024-05-09 RX ORDER — PREDNISONE 10 MG/1
1 BID FOR 1 WEEK THEN ONE A DAY UNTIL GONE TABLET ORAL
Refills: 0 | Status: ACTIVE | COMMUNITY
Start: 2011-03-30

## 2024-05-09 RX ORDER — AMLODIPINE BESYLATE 5 MG/1
TABLET ORAL
Refills: 0 | Status: ACTIVE | COMMUNITY
Start: 2010-12-17

## 2024-05-09 RX ORDER — OMEPRAZOLE 40 MG/1
1 CAPSULE 30 MINUTES BEFORE MORNING MEAL CAPSULE, DELAYED RELEASE ORAL TWICE A DAY
Qty: 180 | Refills: 1 | Status: ACTIVE | COMMUNITY
Start: 2024-02-16

## 2024-05-09 RX ORDER — HYDROCORTISONE 25 MG/G
QD CREAM TOPICAL
Refills: 11 | Status: ACTIVE | COMMUNITY
Start: 2010-03-08

## 2024-05-31 ENCOUNTER — DASHBOARD ENCOUNTERS (OUTPATIENT)
Age: 74
End: 2024-05-31

## 2024-06-03 ENCOUNTER — OFFICE VISIT (OUTPATIENT)
Dept: URBAN - METROPOLITAN AREA CLINIC 63 | Facility: CLINIC | Age: 74
End: 2024-06-03
Payer: MEDICARE

## 2024-06-03 VITALS
BODY MASS INDEX: 37.61 KG/M2 | SYSTOLIC BLOOD PRESSURE: 122 MMHG | WEIGHT: 234 LBS | HEIGHT: 66 IN | TEMPERATURE: 98.7 F | OXYGEN SATURATION: 96 % | DIASTOLIC BLOOD PRESSURE: 76 MMHG | HEART RATE: 82 BPM

## 2024-06-03 DIAGNOSIS — I10 ESSENTIAL (PRIMARY) HYPERTENSION: ICD-10-CM

## 2024-06-03 DIAGNOSIS — K76.0 FATTY LIVER: ICD-10-CM

## 2024-06-03 DIAGNOSIS — D50.0 IRON DEFICIENCY ANEMIA DUE TO CHRONIC BLOOD LOSS: ICD-10-CM

## 2024-06-03 DIAGNOSIS — E11.9 TYPE 2 DIABETES MELLITUS WITHOUT COMPLICATIONS: ICD-10-CM

## 2024-06-03 PROCEDURE — 99214 OFFICE O/P EST MOD 30 MIN: CPT | Performed by: INTERNAL MEDICINE

## 2024-06-03 RX ORDER — PANCRELIPASE LIPASE, PANCRELIPASE PROTEASE, PANCRELIPASE AMYLASE 40000; 126000; 168000 [USP'U]/1; [USP'U]/1; [USP'U]/1
AS DIRECTED CAPSULE, DELAYED RELEASE ORAL TWICE A DAY
Qty: 60 | Refills: 1 | OUTPATIENT
Start: 2024-06-03 | End: 2024-08-02

## 2024-06-03 RX ORDER — KETOCONAZOLE 20 MG/G
CREAM TOPICAL ONCE A DAY
Refills: 0 | COMMUNITY
Start: 2022-01-12

## 2024-06-03 RX ORDER — RAMIPRIL 2.5 MG/1
CAPSULE ORAL ONCE A DAY
Refills: 0 | COMMUNITY
Start: 2022-01-12

## 2024-06-03 RX ORDER — FUROSEMIDE 40 MG/1
TABLET ORAL ONCE A DAY
Refills: 0 | COMMUNITY
Start: 2022-01-12

## 2024-06-03 RX ORDER — ISOSORBIDE MONONITRATE 30 MG/1
TABLET, EXTENDED RELEASE ORAL ONCE A DAY
Refills: 0 | COMMUNITY
Start: 2022-01-12

## 2024-06-03 RX ORDER — ALBUTEROL SULFATE 0.63 MG/3ML
SOLUTION RESPIRATORY (INHALATION) ONCE A DAY
Refills: 0 | COMMUNITY
Start: 2022-01-12

## 2024-06-03 RX ORDER — PREDNISONE 10 MG/1
1 BID FOR 1 WEEK THEN ONE A DAY UNTIL GONE TABLET ORAL
Refills: 0 | COMMUNITY
Start: 2011-03-30

## 2024-06-03 RX ORDER — AMLODIPINE BESYLATE 5 MG/1
TABLET ORAL
Refills: 0 | COMMUNITY
Start: 2010-12-17

## 2024-06-03 RX ORDER — OMEPRAZOLE 40 MG/1
1 CAPSULE 30 MINUTES BEFORE MORNING MEAL CAPSULE, DELAYED RELEASE ORAL TWICE A DAY
Qty: 180 | Refills: 1 | Status: ACTIVE | COMMUNITY
Start: 2024-02-16

## 2024-06-03 RX ORDER — METOPROLOL SUCCINATE 50 MG/1
1 TABLET TABLET, FILM COATED, EXTENDED RELEASE ORAL ONCE A DAY
Refills: 0 | COMMUNITY
Start: 2022-01-12

## 2024-06-03 RX ORDER — ROSUVASTATIN CALCIUM 20 MG/1
TABLET, FILM COATED ORAL ONCE A DAY
Refills: 0 | COMMUNITY
Start: 2022-01-12

## 2024-06-03 RX ORDER — DEXLANSOPRAZOLE 60 MG/1
TAKE 1 CAPSULE BY MOUTH EVERY DAY CAPSULE, DELAYED RELEASE ORAL
Qty: 30 CAPSULE | Refills: 3 | COMMUNITY

## 2024-06-03 RX ORDER — IBUPROFEN 800 MG/1
TABLET, FILM COATED ORAL
Refills: 0 | COMMUNITY
Start: 2010-12-17

## 2024-06-03 RX ORDER — IPRATROPIUM BROMIDE 0.5 MG/2.5ML
SOLUTION RESPIRATORY (INHALATION) ONCE A DAY
Refills: 0 | COMMUNITY
Start: 2022-01-12

## 2024-06-03 RX ORDER — MONTELUKAST SODIUM 10 MG/1
TABLET, FILM COATED ORAL ONCE A DAY
Refills: 0 | COMMUNITY
Start: 2022-01-12

## 2024-06-03 RX ORDER — NITROGLYCERIN 0.4 MG/1
TABLET SUBLINGUAL ONCE A DAY
Refills: 0 | COMMUNITY
Start: 2022-01-12

## 2024-06-03 RX ORDER — ALBUTEROL SULFATE 2.5 MG/3ML
SOLUTION RESPIRATORY (INHALATION)
Refills: 0 | COMMUNITY
Start: 2010-12-17

## 2024-06-03 RX ORDER — HYDROCORTISONE 25 MG/G
QD CREAM TOPICAL
Refills: 11 | COMMUNITY
Start: 2010-03-08

## 2024-06-03 RX ORDER — TIOTROPIUM BROMIDE 18 UG/1
CAPSULE ORAL; RESPIRATORY (INHALATION) ONCE A DAY
Refills: 0 | COMMUNITY
Start: 2022-01-12

## 2024-06-03 RX ORDER — DULAGLUTIDE 0.75 MG/.5ML
INJECTION, SOLUTION SUBCUTANEOUS
Qty: 2 MILLILITER | Refills: 2 | Status: ACTIVE | COMMUNITY

## 2024-06-03 NOTE — HPI-TODAY'S VISIT:
He finished the Prednisone and the LLQ pain has totally resolved. He continues with loose stools about 4-5 times a day. He is also having some pain around the area of the previous hernia repair.  Patient with chronic diarrhea with no evidence of colitis or  celiac disease plan was to start lomotil to control the diarrhea.  Patient had colonoscopy with adenoma polyp removed will need colonoscopy surveillance in 5 years.  Patient had some constipation and cholestyramine was stopped, patient doing well.  Patient comes for surveillance colonoscopy with h/o polyps, with family h/o colon cancer (father, uncles, grandfather)  as per patient request will do at the beginning of .  Colonoscopy showed internal hemorrhoids,  multiple adenomas will repeat colonoscopy in one year.  14: comes for surveillance colonoscopy  will do colonoscopy. patient with reflux and epigastric pain will do EGD to evaluate and r/o gastritis, esophagitis.  Will continue PPI.  : patient had colonoscopy with evidence of adenomas and hyperplastic polyps will need a repeat colonoscopy in 1 years.  Patient with strong family h/o colon cancer.  EGD does showed gastritis negative for H pylori no evidence of Jo's. Discussed the need for appropriate follow-up care.  Patient will be placed in our recall system and a letter will be mailed to the patient.   3/15 Patient is here for his screen colonoscopy, stated has history of multiple polyps and family history of colon cancer, stated has acid reflux, epigastric pain, and slow digestion and nausea. Plan EGD, Colonoscopy, Gastric Emptying study. Zofran, Ranitidine Dexilant. RUQ. 6/15: patient had ultrasound that showed fatty liver and renal cysts, GES is normal EGD and colonoscopy showed  hiatal hernia , gastritis negative for h pylori and colonoscopy showed polyps, hyperplastic and adenoma will repeat colonoscopy in 3 years.  Patient with bloating and abdominal distension. Will start proctitis, will need to do diet. 10/15: Patient with abdominal discomfort with improvement with probiotics, (florastor) but the insurance does not cover the probiotics.  Will try another probiotics and will continue with PPI. : Patient went to [place] with epigastric pain, with reflux will continue PPI and will consider EGD to r/o PUD/ h pylori status. Patient with change on bowel habit  and LLQ pain, with some improvement of the diarrhea and now constipation, will plan colonoscopy.  In the last 3 months patient had another family member his aunt with colon cancer. : Patient had EGD and colonoscopy with evidence of  hiatal hernia gastritis negative for h pylori, colonoscopy with hemorrhoids and polyps adenoma (3 in descending colon) Will do surveillance colonoscopy in 3 years. Will try in 2 years as per patient request.  3/17:  Patient comes with discomfort in his left abdomen and increase in gas, will restart probiotics, will continue with dexilant. Patient with personal h/o colonic polyps with multiples family members with colon cancer, found out recently of 2 more familiars with colon cancer and he is afraid  because of his high risk.  Will plan colonoscopy this summer.  :  Patient persist with bloating will do probiotics, insurance did not cover the treatment, will give sample.  Patient will need colonoscopy and EGD patient with trip to [place] will plan endoscopic studies in 2017.   : patient had EGD and colonoscopy with evidence of hiatal hernia, and gastritis negative for h pylori, colonoscopy showed a 8 mm polyp at the transverse colon and 2 more polyps in the area smaller 5 mm in diameter. Internal hemorrhoids and 3 small polyps at the descending colon. All the polyps are tubular adenoma will repeat colonoscopy in 3 years. Patient with large scar in the mid abdomen with discomfort, and ventral hernia.  :  Patient comes with persistent symptom of discomfort in the abdomen, no evidence of obstruction, with bloating and gas, was treated with probiotics with some improvement. WIll CT scan of abdomen and pelvis to r/o hernias that could be causing partial obstruction. and will start IB diaz, will follow in 2 months. : Patient had CT scan with no pathology that could explain his symptoms. patient with bloating and fullness, with symptoms of slow digestion, and gas. Will start pancreatic enzymes, no improvement with probiotics. Previous GES was normal. Will start trial with creon and will follow in 2 months, If treatment do not improved the symptoms will consider short treatment with antibiotics follow by probiotics.  Patient said he is doing well with Creon once at day after his lunch. Patient is here requiring to have his colonoscopy done, he has medical history of multiples adenoma polyps in the last 3 years, also multiples family members with CRC ( Mainly aunts and uncles ).  Plan: Patient will continue with Creon and also will have surveillance colonoscopy.  Patient said he is doing well with Creon once at day after his lunch. also requesting Dexilant prescription due to reflux and gastritis symptoms at times. Colonoscopy with evidence of one tubular adenoma polyp, and internal hemorrhoids. Patient will do diet and continue with Dexilant and Creon, side effect of PPI were discussed with him. : Patient comes with abdominal pain, will plan EGD and patient with strong family h/o colon cancer wants to have a surveillance colonoscopy.  Will plan EGD and colonoscopy. Patient had a recent trip to [place] and start having dyspepsia, also found out that another cousin had colon cancer at age 60 and she was from her mother side, other were related to his father.  10/21: Patient had in 2020 EGD and colonoscopy with evidence of normal duodenum, gastritis negative for H pylori, distal esophageal reflux negative for Jo's esophagus. Colonoscopy showed tubular adenoma in the transverse colon ( 5 mm) will need repeat colonoscopy in 5 years ad internal hemorrhoids. Patient went to [facility] and since has epigastric pain  and bloating. Will do trial with PPI and will do EGD and will plan colonoscopy. Patient states that other cousin  of colon cancer in [place]. I tried to explain to him the last findings of his colonoscopy, patient states now has constipation and rare rectal bleeding. Will plan EGD and colonoscopy.  : Patient had EGD and colonoscopy with evidence of normal duodenum, gastritis negative for H pylori, fundic gland, distal esophageal reflux, with carditis, negative for Intestinal Metaplasia. Cecal Polyp ( tubular adenoma) and internal hemorrhoids, normal rectal mucosa. Will plan colonoscopy in 3 years. ; Patient comes for evaluation after oncologist who recommended to repeat endoscopic evaluation for drop in HB and with iron deficiency, will do EGD and colonoscopy.  Patient takes occasional NSAID with black stool and  rare rectal bleeding. 10/23: Patient had upper endoscopy and colonoscopy on 2023 with the impression of a normal esophagus, small hiatal hernia, chronic gastritis, normal duodenum.  Colonoscopy showed internal hemorrhoids otherwise normal colon was recommended to repeat a colonoscopy in 5 years because of personal history.  Pathology report had peptic duodenitis, chronic gastritis changes related to proton pump inhibitor, negative for H. pylori, dysplasia or malignancy, distal esophagus which is well mucosa without significant normality, no Jo's esophagus, dysplasia or malignancy.  With this finding we will try to control acid production, with a lower dose of PPI.  We will continue diet and will follow-up as needed basis.Will need to avoid use of NSAID. : Patient is evidence of January of this year had an ultrasound of the abdomen with the impression of interval decrease in size of the bilateral renal cyst, the echogenic liver suggestive of hepatic steatosis and no acute abnormality.  With this finding patient will have a FibroScan, will consider liver function labs.  And lipid panel.  Patient with history of peptic duodenitis and dyspepsia on treatment with diet and medication. Patient needs to do diet and weight loss. I spoke with his PCP , she already order the fibroscan and labs results will be sent to us. Will follow in 4 months. : Patient is being seen today at the office.  With personal history of fatty liver, had a FibroScan with evidence of severe steatosis S3 and no clinical significant fibrosis F0.  Recent labs shows an normal CBC with white blood cell count of 5.7 hemoglobin of 13.4, and platelet count of 269.  PSA was ordered that is in the normal range at 0.46.  Labs show normal INR CMP with normal LFTs, and bilirubin.  Does have an elevated TSH of 6.46 with normal free T4 and total T3.  Normal CBC.  Low vitamin D lipid panel with elevated triglycerides 169 total cholesterol of 283, with elevated  hepatitis A antibody is positive.  This could be related to previous infection or vaccination.  At this moment patient will need to continue doing diet weight loss exercise, will need to control the lipid panel that is abnormal.  With previous history of duodenitis patient need to avoid the use of NSAIDs like ibuprofen and will continue with treatment with PPI will try to wean him of after duodenitis is resolved. Patient had with his oncologist panel of iron and persist low iron, could be related to hs duodenintis, will need IV iron supplement. Will consider further w/u Will increase dose of PPI and will do EGD. and colonoscopy if there are negative will do capsule endocopy. : Patient had upper endoscopy and colonoscopy in May 2024 with impression of the upper endoscopy of normal esophagus, small hiatal hernia, chronic gastritis, and normal duodenum.  Colonoscopy otherwise normal colon and internal hemorrhoids.  Pathology report show no significant normality in the duodenum, chemical reactive gastropathy at the stomach with no evidence of dysplasia or malignancy or intestinal metaplasia.  Distal esophagus with a squamous columnar mucosa with chronic inflammation negative for intestinal metaplasia or dysplasia negative for eosinophils.  With this finding there is no evidence of disease of pathology that could explain the decrease of iron on the patient, there is no evidence of duodenitis, will continue to monitor her his iron and hemoglobin if there is any discrepancy we will consider doing capsule endoscopy for further workup.  As per protocol colonoscopy could be repeated in 5 years.  Will continue with treatment and diet for acid reflux.Will try to decrease PPI to increase his iron absortion in the duodenum. atient with CT scan with normal pancreas and liver. with left colon apendigitis, and renal cysts. Will follow in 6 months.

## 2024-06-26 ENCOUNTER — OFFICE VISIT (OUTPATIENT)
Dept: URBAN - METROPOLITAN AREA CLINIC 63 | Facility: CLINIC | Age: 74
End: 2024-06-26

## 2024-07-31 ENCOUNTER — FOLLOW UP (OUTPATIENT)
Dept: URBAN - METROPOLITAN AREA CLINIC 26 | Facility: CLINIC | Age: 74
End: 2024-07-31

## 2024-07-31 VITALS — HEIGHT: 66 IN | BODY MASS INDEX: 36.32 KG/M2 | WEIGHT: 226 LBS

## 2024-07-31 DIAGNOSIS — Z96.1: ICD-10-CM

## 2024-07-31 DIAGNOSIS — H31.092: ICD-10-CM

## 2024-07-31 DIAGNOSIS — D31.31: ICD-10-CM

## 2024-07-31 DIAGNOSIS — H04.123: ICD-10-CM

## 2024-07-31 DIAGNOSIS — H02.833: ICD-10-CM

## 2024-07-31 DIAGNOSIS — H02.836: ICD-10-CM

## 2024-07-31 DIAGNOSIS — H35.3131: ICD-10-CM

## 2024-07-31 DIAGNOSIS — H10.13: ICD-10-CM

## 2024-07-31 DIAGNOSIS — H33.102: ICD-10-CM

## 2024-07-31 DIAGNOSIS — H43.813: ICD-10-CM

## 2024-07-31 DIAGNOSIS — H35.372: ICD-10-CM

## 2024-07-31 DIAGNOSIS — H47.092: ICD-10-CM

## 2024-07-31 PROCEDURE — 92134 CPTRZ OPH DX IMG PST SGM RTA: CPT

## 2024-07-31 PROCEDURE — 99213 OFFICE O/P EST LOW 20 MIN: CPT

## 2024-07-31 PROCEDURE — 92250 FUNDUS PHOTOGRAPHY W/I&R: CPT

## 2024-07-31 RX ORDER — FLUOROMETHOLONE 1 MG/ML: 1 SUSPENSION/ DROPS OPHTHALMIC TWICE A DAY

## 2024-07-31 ASSESSMENT — VISUAL ACUITY
OS_SC: 20/40-2
OS_PH: 20/30
OD_SC: 20/25-2

## 2024-07-31 ASSESSMENT — TONOMETRY
OS_IOP_MMHG: 13
OD_IOP_MMHG: 12

## 2024-08-07 ENCOUNTER — ERX REFILL RESPONSE (OUTPATIENT)
Dept: URBAN - METROPOLITAN AREA CLINIC 63 | Facility: CLINIC | Age: 74
End: 2024-08-07

## 2024-08-07 RX ORDER — PANCRELIPASE LIPASE, PANCRELIPASE PROTEASE, PANCRELIPASE AMYLASE 40000; 126000; 168000 [USP'U]/1; [USP'U]/1; [USP'U]/1
2 CAPSULES WITH MEALS AND 1 WITH SNACKS CAPSULE, DELAYED RELEASE ORAL
Qty: 900 CAPSULES | Refills: 3 | OUTPATIENT

## 2024-08-07 RX ORDER — PANCRELIPASE LIPASE, PANCRELIPASE PROTEASE, PANCRELIPASE AMYLASE 40000; 126000; 168000 [USP'U]/1; [USP'U]/1; [USP'U]/1
TAKE ONE CAPSULE BY MOUTH TWICE A DAY CAPSULE, DELAYED RELEASE ORAL
Qty: 120 CAPSULE | Refills: 1 | OUTPATIENT

## 2024-11-04 ENCOUNTER — OFFICE VISIT (OUTPATIENT)
Dept: URBAN - METROPOLITAN AREA CLINIC 63 | Facility: CLINIC | Age: 74
End: 2024-11-04
Payer: MEDICARE

## 2024-11-04 ENCOUNTER — LAB OUTSIDE AN ENCOUNTER (OUTPATIENT)
Dept: URBAN - METROPOLITAN AREA CLINIC 63 | Facility: CLINIC | Age: 74
End: 2024-11-04

## 2024-11-04 VITALS
TEMPERATURE: 98.8 F | SYSTOLIC BLOOD PRESSURE: 145 MMHG | OXYGEN SATURATION: 98 % | HEART RATE: 105 BPM | DIASTOLIC BLOOD PRESSURE: 84 MMHG | BODY MASS INDEX: 35.2 KG/M2 | WEIGHT: 219 LBS | HEIGHT: 66 IN

## 2024-11-04 DIAGNOSIS — D50.9 ANEMIA, IRON DEFICIENCY: ICD-10-CM

## 2024-11-04 DIAGNOSIS — I10 ESSENTIAL (PRIMARY) HYPERTENSION: ICD-10-CM

## 2024-11-04 DIAGNOSIS — K21.00 ALKALINE REFLUX ESOPHAGITIS: ICD-10-CM

## 2024-11-04 DIAGNOSIS — K64.1 GRADE II HEMORRHOIDS: ICD-10-CM

## 2024-11-04 DIAGNOSIS — K76.0 FATTY LIVER: ICD-10-CM

## 2024-11-04 PROCEDURE — 99214 OFFICE O/P EST MOD 30 MIN: CPT | Performed by: INTERNAL MEDICINE

## 2024-11-04 RX ORDER — TIOTROPIUM BROMIDE 18 UG/1
CAPSULE ORAL; RESPIRATORY (INHALATION) ONCE A DAY
Refills: 0 | Status: ACTIVE | COMMUNITY
Start: 2022-01-12

## 2024-11-04 RX ORDER — OMEPRAZOLE 40 MG/1
1 CAPSULE 30 MINUTES BEFORE MORNING MEAL CAPSULE, DELAYED RELEASE ORAL TWICE A DAY
Qty: 180 | Refills: 1 | Status: ACTIVE | COMMUNITY
Start: 2024-02-16

## 2024-11-04 RX ORDER — DULAGLUTIDE 0.75 MG/.5ML
INJECTION, SOLUTION SUBCUTANEOUS
Qty: 2 MILLILITER | Refills: 2 | Status: ACTIVE | COMMUNITY

## 2024-11-04 RX ORDER — FUROSEMIDE 40 MG/1
TABLET ORAL ONCE A DAY
Refills: 0 | Status: ACTIVE | COMMUNITY
Start: 2022-01-12

## 2024-11-04 RX ORDER — HYDROCORTISONE 25 MG/G
QD CREAM TOPICAL
Refills: 11 | Status: ACTIVE | COMMUNITY
Start: 2010-03-08

## 2024-11-04 RX ORDER — PANCRELIPASE LIPASE, PANCRELIPASE PROTEASE, PANCRELIPASE AMYLASE 40000; 126000; 168000 [USP'U]/1; [USP'U]/1; [USP'U]/1
2 CAPSULES WITH MEALS AND 1 WITH SNACKS CAPSULE, DELAYED RELEASE ORAL
Qty: 900 CAPSULES | Refills: 1 | OUTPATIENT

## 2024-11-04 RX ORDER — PREDNISONE 10 MG/1
1 BID FOR 1 WEEK THEN ONE A DAY UNTIL GONE TABLET ORAL
Refills: 0 | Status: ACTIVE | COMMUNITY
Start: 2011-03-30

## 2024-11-04 RX ORDER — AMLODIPINE BESYLATE 5 MG/1
TABLET ORAL
Refills: 0 | Status: ACTIVE | COMMUNITY
Start: 2010-12-17

## 2024-11-04 RX ORDER — METOPROLOL SUCCINATE 50 MG/1
1 TABLET TABLET, FILM COATED, EXTENDED RELEASE ORAL ONCE A DAY
Refills: 0 | Status: ACTIVE | COMMUNITY
Start: 2022-01-12

## 2024-11-04 RX ORDER — ROSUVASTATIN CALCIUM 20 MG/1
TABLET, FILM COATED ORAL ONCE A DAY
Refills: 0 | Status: ACTIVE | COMMUNITY
Start: 2022-01-12

## 2024-11-04 RX ORDER — OMEPRAZOLE 20 MG/1
1 CAPSULE 1/2 TO 1 HOUR BEFORE MORNING MEAL CAPSULE, DELAYED RELEASE ORAL ONCE A DAY
Qty: 90 | OUTPATIENT
Start: 2024-11-04

## 2024-11-04 RX ORDER — HYDROCORTISONE 25 MG/G
1 APPLICATION CREAM TOPICAL TWICE A DAY
Qty: 1 | Refills: 1 | OUTPATIENT
Start: 2024-11-04 | End: 2024-11-24

## 2024-11-04 RX ORDER — ALBUTEROL SULFATE 0.63 MG/3ML
SOLUTION RESPIRATORY (INHALATION) ONCE A DAY
Refills: 0 | Status: ACTIVE | COMMUNITY
Start: 2022-01-12

## 2024-11-04 RX ORDER — RAMIPRIL 2.5 MG/1
CAPSULE ORAL ONCE A DAY
Refills: 0 | Status: ACTIVE | COMMUNITY
Start: 2022-01-12

## 2024-11-04 RX ORDER — IBUPROFEN 800 MG/1
TABLET, FILM COATED ORAL
Refills: 0 | Status: ACTIVE | COMMUNITY
Start: 2010-12-17

## 2024-11-04 RX ORDER — KETOCONAZOLE 20 MG/G
CREAM TOPICAL ONCE A DAY
Refills: 0 | Status: ACTIVE | COMMUNITY
Start: 2022-01-12

## 2024-11-04 RX ORDER — ALBUTEROL SULFATE 2.5 MG/3ML
SOLUTION RESPIRATORY (INHALATION)
Refills: 0 | Status: ACTIVE | COMMUNITY
Start: 2010-12-17

## 2024-11-04 RX ORDER — IPRATROPIUM BROMIDE 0.5 MG/2.5ML
SOLUTION RESPIRATORY (INHALATION) ONCE A DAY
Refills: 0 | Status: ACTIVE | COMMUNITY
Start: 2022-01-12

## 2024-11-04 RX ORDER — NITROGLYCERIN 0.4 MG/1
TABLET SUBLINGUAL ONCE A DAY
Refills: 0 | Status: ACTIVE | COMMUNITY
Start: 2022-01-12

## 2024-11-04 RX ORDER — PANCRELIPASE LIPASE, PANCRELIPASE PROTEASE, PANCRELIPASE AMYLASE 40000; 126000; 168000 [USP'U]/1; [USP'U]/1; [USP'U]/1
2 CAPSULES WITH MEALS AND 1 WITH SNACKS CAPSULE, DELAYED RELEASE ORAL
Qty: 900 CAPSULES | Refills: 3 | Status: ACTIVE | COMMUNITY

## 2024-11-04 RX ORDER — ISOSORBIDE MONONITRATE 30 MG/1
TABLET, EXTENDED RELEASE ORAL ONCE A DAY
Refills: 0 | Status: ACTIVE | COMMUNITY
Start: 2022-01-12

## 2024-11-04 RX ORDER — DEXLANSOPRAZOLE 60 MG/1
TAKE 1 CAPSULE BY MOUTH EVERY DAY CAPSULE, DELAYED RELEASE ORAL
Qty: 30 CAPSULE | Refills: 3 | Status: ACTIVE | COMMUNITY

## 2024-11-04 NOTE — HPI-TODAY'S VISIT:
He finished the Prednisone and the LLQ pain has totally resolved. He continues with loose stools about 4-5 times a day. He is also having some pain around the area of the previous hernia repair.  Patient with chronic diarrhea with no evidence of colitis or  celiac disease plan was to start lomotil to control the diarrhea.  Patient had colonoscopy with adenoma polyp removed will need colonoscopy surveillance in 5 years.  Patient had some constipation and cholestyramine was stopped, patient doing well.  Patient comes for surveillance colonoscopy with h/o polyps, with family h/o colon cancer (father, uncles, grandfather)  as per patient request will do at the beginning of .  Colonoscopy showed internal hemorrhoids,  multiple adenomas will repeat colonoscopy in one year.  14: comes for surveillance colonoscopy  will do colonoscopy. patient with reflux and epigastric pain will do EGD to evaluate and r/o gastritis, esophagitis.  Will continue PPI.  : patient had colonoscopy with evidence of adenomas and hyperplastic polyps will need a repeat colonoscopy in 1 years.  Patient with strong family h/o colon cancer.  EGD does showed gastritis negative for H pylori no evidence of Jo's. Discussed the need for appropriate follow-up care.  Patient will be placed in our recall system and a letter will be mailed to the patient.   3/15 Patient is here for his screen colonoscopy, stated has history of multiple polyps and family history of colon cancer, stated has acid reflux, epigastric pain, and slow digestion and nausea. Plan EGD, Colonoscopy, Gastric Emptying study. Zofran, Ranitidine Dexilant. RUQ. 6/15: patient had ultrasound that showed fatty liver and renal cysts, GES is normal EGD and colonoscopy showed  hiatal hernia , gastritis negative for h pylori and colonoscopy showed polyps, hyperplastic and adenoma will repeat colonoscopy in 3 years.  Patient with bloating and abdominal distension. Will start proctitis, will need to do diet. 10/15: Patient with abdominal discomfort with improvement with probiotics, (florastor) but the insurance does not cover the probiotics.  Will try another probiotics and will continue with PPI. : Patient went to [place] with epigastric pain, with reflux will continue PPI and will consider EGD to r/o PUD/ h pylori status. Patient with change on bowel habit  and LLQ pain, with some improvement of the diarrhea and now constipation, will plan colonoscopy.  In the last 3 months patient had another family member his aunt with colon cancer. : Patient had EGD and colonoscopy with evidence of  hiatal hernia gastritis negative for h pylori, colonoscopy with hemorrhoids and polyps adenoma (3 in descending colon) Will do surveillance colonoscopy in 3 years. Will try in 2 years as per patient request.  3/17:  Patient comes with discomfort in his left abdomen and increase in gas, will restart probiotics, will continue with dexilant. Patient with personal h/o colonic polyps with multiples family members with colon cancer, found out recently of 2 more familiars with colon cancer and he is afraid  because of his high risk.  Will plan colonoscopy this summer.  :  Patient persist with bloating will do probiotics, insurance did not cover the treatment, will give sample.  Patient will need colonoscopy and EGD patient with trip to [place] will plan endoscopic studies in 2017.   : patient had EGD and colonoscopy with evidence of hiatal hernia, and gastritis negative for h pylori, colonoscopy showed a 8 mm polyp at the transverse colon and 2 more polyps in the area smaller 5 mm in diameter. Internal hemorrhoids and 3 small polyps at the descending colon. All the polyps are tubular adenoma will repeat colonoscopy in 3 years. Patient with large scar in the mid abdomen with discomfort, and ventral hernia.  :  Patient comes with persistent symptom of discomfort in the abdomen, no evidence of obstruction, with bloating and gas, was treated with probiotics with some improvement. WIll CT scan of abdomen and pelvis to r/o hernias that could be causing partial obstruction. and will start IB diaz, will follow in 2 months. : Patient had CT scan with no pathology that could explain his symptoms. patient with bloating and fullness, with symptoms of slow digestion, and gas. Will start pancreatic enzymes, no improvement with probiotics. Previous GES was normal. Will start trial with creon and will follow in 2 months, If treatment do not improved the symptoms will consider short treatment with antibiotics follow by probiotics.  Patient said he is doing well with Creon once at day after his lunch. Patient is here requiring to have his colonoscopy done, he has medical history of multiples adenoma polyps in the last 3 years, also multiples family members with CRC ( Mainly aunts and uncles ).  Plan: Patient will continue with Creon and also will have surveillance colonoscopy.  Patient said he is doing well with Creon once at day after his lunch. also requesting Dexilant prescription due to reflux and gastritis symptoms at times. Colonoscopy with evidence of one tubular adenoma polyp, and internal hemorrhoids. Patient will do diet and continue with Dexilant and Creon, side effect of PPI were discussed with him. : Patient comes with abdominal pain, will plan EGD and patient with strong family h/o colon cancer wants to have a surveillance colonoscopy.  Will plan EGD and colonoscopy. Patient had a recent trip to [place] and start having dyspepsia, also found out that another cousin had colon cancer at age 60 and she was from her mother side, other were related to his father.  10/21: Patient had in 2020 EGD and colonoscopy with evidence of normal duodenum, gastritis negative for H pylori, distal esophageal reflux negative for Jo's esophagus. Colonoscopy showed tubular adenoma in the transverse colon ( 5 mm) will need repeat colonoscopy in 5 years ad internal hemorrhoids. Patient went to [facility] and since has epigastric pain  and bloating. Will do trial with PPI and will do EGD and will plan colonoscopy. Patient states that other cousin  of colon cancer in [place]. I tried to explain to him the last findings of his colonoscopy, patient states now has constipation and rare rectal bleeding. Will plan EGD and colonoscopy.  : Patient had EGD and colonoscopy with evidence of normal duodenum, gastritis negative for H pylori, fundic gland, distal esophageal reflux, with carditis, negative for Intestinal Metaplasia. Cecal Polyp ( tubular adenoma) and internal hemorrhoids, normal rectal mucosa. Will plan colonoscopy in 3 years. ; Patient comes for evaluation after oncologist who recommended to repeat endoscopic evaluation for drop in HB and with iron deficiency, will do EGD and colonoscopy.  Patient takes occasional NSAID with black stool and  rare rectal bleeding. 10/23: Patient had upper endoscopy and colonoscopy on 2023 with the impression of a normal esophagus, small hiatal hernia, chronic gastritis, normal duodenum.  Colonoscopy showed internal hemorrhoids otherwise normal colon was recommended to repeat a colonoscopy in 5 years because of personal history.  Pathology report had peptic duodenitis, chronic gastritis changes related to proton pump inhibitor, negative for H. pylori, dysplasia or malignancy, distal esophagus which is well mucosa without significant normality, no Jo's esophagus, dysplasia or malignancy.  With this finding we will try to control acid production, with a lower dose of PPI.  We will continue diet and will follow-up as needed basis.Will need to avoid use of NSAID. : Patient is evidence of January of this year had an ultrasound of the abdomen with the impression of interval decrease in size of the bilateral renal cyst, the echogenic liver suggestive of hepatic steatosis and no acute abnormality.  With this finding patient will have a FibroScan, will consider liver function labs.  And lipid panel.  Patient with history of peptic duodenitis and dyspepsia on treatment with diet and medication. Patient needs to do diet and weight loss. I spoke with his PCP , she already order the fibroscan and labs results will be sent to us. Will follow in 4 months. : Patient is being seen today at the office.  With personal history of fatty liver, had a FibroScan with evidence of severe steatosis S3 and no clinical significant fibrosis F0.  Recent labs shows an normal CBC with white blood cell count of 5.7 hemoglobin of 13.4, and platelet count of 269.  PSA was ordered that is in the normal range at 0.46.  Labs show normal INR CMP with normal LFTs, and bilirubin.  Does have an elevated TSH of 6.46 with normal free T4 and total T3.  Normal CBC.  Low vitamin D lipid panel with elevated triglycerides 169 total cholesterol of 283, with elevated  hepatitis A antibody is positive.  This could be related to previous infection or vaccination.  At this moment patient will need to continue doing diet weight loss exercise, will need to control the lipid panel that is abnormal.  With previous history of duodenitis patient need to avoid the use of NSAIDs like ibuprofen and will continue with treatment with PPI will try to wean him of after duodenitis is resolved. Patient had with his oncologist panel of iron and persist low iron, could be related to hs duodenintis, will need IV iron supplement. Will consider further w/u Will increase dose of PPI and will do EGD. and colonoscopy if there are negative will do capsule endocopy. : Patient had upper endoscopy and colonoscopy in May 2024 with impression of the upper endoscopy of normal esophagus, small hiatal hernia, chronic gastritis, and normal duodenum.  Colonoscopy otherwise normal colon and internal hemorrhoids.  Pathology report show no significant normality in the duodenum, chemical reactive gastropathy at the stomach with no evidence of dysplasia or malignancy or intestinal metaplasia.  Distal esophagus with a squamous columnar mucosa with chronic inflammation negative for intestinal metaplasia or dysplasia negative for eosinophils.  With this finding there is no evidence of disease of pathology that could explain the decrease of iron on the patient, there is no evidence of duodenitis, will continue to monitor her his iron and hemoglobin if there is any discrepancy we will consider doing capsule endoscopy for further workup.  As per protocol colonoscopy could be repeated in 5 years.  Will continue with treatment and diet for acid reflux.Will try to decrease PPI to increase his iron absortion in the duodenum. Patient with CT scan with normal pancreas and liver. with left colon apendigitis, and renal cysts. Will follow in 6 months. : Patient  seen after 5 months, with personal h/o iron deficiency anemia. Patient was recent lab in 2024 with elevation of WBC of 12.1, hemoglobin is normal 13.9, platelet count 428,000.  MCV is low 79.5, differential does show mild elevation of neutrophils with normal coagulation time, normal electrolytes, mild elevation of glucose 133, creatinine normal 0.8, normal LFTs with an AST of 25 ALT of 15 total bilirubin of 0.6 alkaline phosphatase of 71.  Also normal lipase of 62 patient had an ultrasound with impression of hepatomegaly and fatty changes.  Prominent gallbladder wall but no stone, may be a calculus cholecystitis.  Patient had this ultrasound at the hospital because of abdominal pain.  Patient was seen at the hospital in August of this year because of chest pain epigastric and sternal pain after doing the initial workup the symptoms resolved and patient was discharged for follow-up as outpatient.  Was recommended to follow-up with her cardiologist patient with personal history of hypertension, right bundle branch block.  Patient with history of iron deficiency but now with evidence of anemia  Hb:12. 8  and low iron 19. Will consider further workup  will do CT scan of abdomen and pelvis, with do the capsule endoscopy with previous small bowel follow through.  Will continue workup of fatty liver we will repeat FibroScan the beginning of the next year patient with severe steatosis and no fibrosis on her last FibroScan done in February of this year.  Will place the order and will follow in 4-month.

## 2024-12-05 ENCOUNTER — TELEPHONE ENCOUNTER (OUTPATIENT)
Dept: URBAN - METROPOLITAN AREA CLINIC 63 | Facility: CLINIC | Age: 74
End: 2024-12-05

## 2024-12-05 RX ORDER — DEXLANSOPRAZOLE 60 MG/1
TAKE 1 CAPSULE BY MOUTH EVERY DAY CAPSULE, DELAYED RELEASE ORAL ONCE A DAY
Qty: 30 | Refills: 3

## 2024-12-05 RX ORDER — OMEPRAZOLE 20 MG/1
1 CAPSULE 1/2 TO 1 HOUR BEFORE MORNING MEAL CAPSULE, DELAYED RELEASE ORAL ONCE A DAY
Qty: 90
Start: 2024-11-04

## 2024-12-05 RX ORDER — OMEPRAZOLE 40 MG/1
1 CAPSULE 30 MINUTES BEFORE MORNING MEAL CAPSULE, DELAYED RELEASE ORAL TWICE A DAY
Qty: 180 | Refills: 1
Start: 2024-02-16

## 2024-12-05 RX ORDER — PANCRELIPASE LIPASE, PANCRELIPASE PROTEASE, PANCRELIPASE AMYLASE 40000; 126000; 168000 [USP'U]/1; [USP'U]/1; [USP'U]/1
2 CAPSULES WITH MEALS AND 1 WITH SNACKS CAPSULE, DELAYED RELEASE ORAL
Qty: 900 CAPSULES | Refills: 1
End: 2025-06-03

## 2024-12-16 ENCOUNTER — TELEPHONE ENCOUNTER (OUTPATIENT)
Dept: URBAN - METROPOLITAN AREA CLINIC 63 | Facility: CLINIC | Age: 74
End: 2024-12-16

## 2024-12-16 ENCOUNTER — OFFICE VISIT (OUTPATIENT)
Dept: URBAN - METROPOLITAN AREA CLINIC 61 | Facility: CLINIC | Age: 74
End: 2024-12-16
Payer: MEDICARE

## 2024-12-16 DIAGNOSIS — D50.9 IRON DEFICIENCY ANEMIA, UNSPECIFIED IRON DEFICIENCY ANEMIA TYPE: ICD-10-CM

## 2024-12-16 PROCEDURE — 91110 GI TRC IMG INTRAL ESOPH-ILE: CPT | Performed by: INTERNAL MEDICINE

## 2024-12-16 RX ORDER — IPRATROPIUM BROMIDE 0.5 MG/2.5ML
SOLUTION RESPIRATORY (INHALATION) ONCE A DAY
Refills: 0 | Status: ACTIVE | COMMUNITY
Start: 2022-01-12

## 2024-12-16 RX ORDER — ALBUTEROL SULFATE 0.63 MG/3ML
SOLUTION RESPIRATORY (INHALATION) ONCE A DAY
Refills: 0 | Status: ACTIVE | COMMUNITY
Start: 2022-01-12

## 2024-12-16 RX ORDER — DEXLANSOPRAZOLE 60 MG/1
TAKE 1 CAPSULE BY MOUTH EVERY DAY CAPSULE, DELAYED RELEASE ORAL ONCE A DAY
Qty: 30 | Refills: 3 | Status: ACTIVE | COMMUNITY

## 2024-12-16 RX ORDER — TIOTROPIUM BROMIDE 18 UG/1
CAPSULE ORAL; RESPIRATORY (INHALATION) ONCE A DAY
Refills: 0 | Status: ACTIVE | COMMUNITY
Start: 2022-01-12

## 2024-12-16 RX ORDER — FUROSEMIDE 40 MG/1
TABLET ORAL ONCE A DAY
Refills: 0 | Status: ACTIVE | COMMUNITY
Start: 2022-01-12

## 2024-12-16 RX ORDER — DULAGLUTIDE 0.75 MG/.5ML
INJECTION, SOLUTION SUBCUTANEOUS
Qty: 2 MILLILITER | Refills: 2 | Status: ACTIVE | COMMUNITY

## 2024-12-16 RX ORDER — HYDROCORTISONE 25 MG/G
QD CREAM TOPICAL
Refills: 11 | Status: ACTIVE | COMMUNITY
Start: 2010-03-08

## 2024-12-16 RX ORDER — OMEPRAZOLE 20 MG/1
1 CAPSULE 1/2 TO 1 HOUR BEFORE MORNING MEAL CAPSULE, DELAYED RELEASE ORAL ONCE A DAY
Qty: 90 | Status: ACTIVE | COMMUNITY
Start: 2024-11-04

## 2024-12-16 RX ORDER — IBUPROFEN 800 MG/1
TABLET, FILM COATED ORAL
Refills: 0 | Status: ACTIVE | COMMUNITY
Start: 2010-12-17

## 2024-12-16 RX ORDER — PANCRELIPASE LIPASE, PANCRELIPASE PROTEASE, PANCRELIPASE AMYLASE 40000; 126000; 168000 [USP'U]/1; [USP'U]/1; [USP'U]/1
2 CAPSULES WITH MEALS AND 1 WITH SNACKS CAPSULE, DELAYED RELEASE ORAL
Qty: 900 CAPSULES | Refills: 1 | Status: ACTIVE | COMMUNITY
End: 2025-06-03

## 2024-12-16 RX ORDER — ISOSORBIDE MONONITRATE 30 MG/1
TABLET, EXTENDED RELEASE ORAL ONCE A DAY
Refills: 0 | Status: ACTIVE | COMMUNITY
Start: 2022-01-12

## 2024-12-16 RX ORDER — ROSUVASTATIN CALCIUM 20 MG/1
TABLET, FILM COATED ORAL ONCE A DAY
Refills: 0 | Status: ACTIVE | COMMUNITY
Start: 2022-01-12

## 2024-12-16 RX ORDER — PREDNISONE 10 MG/1
1 BID FOR 1 WEEK THEN ONE A DAY UNTIL GONE TABLET ORAL
Refills: 0 | Status: ACTIVE | COMMUNITY
Start: 2011-03-30

## 2024-12-16 RX ORDER — NITROGLYCERIN 0.4 MG/1
TABLET SUBLINGUAL ONCE A DAY
Refills: 0 | Status: ACTIVE | COMMUNITY
Start: 2022-01-12

## 2024-12-16 RX ORDER — METOPROLOL SUCCINATE 50 MG/1
1 TABLET TABLET, FILM COATED, EXTENDED RELEASE ORAL ONCE A DAY
Refills: 0 | Status: ACTIVE | COMMUNITY
Start: 2022-01-12

## 2024-12-16 RX ORDER — OMEPRAZOLE 40 MG/1
1 CAPSULE 30 MINUTES BEFORE MORNING MEAL CAPSULE, DELAYED RELEASE ORAL TWICE A DAY
Qty: 180 | Refills: 1 | Status: ACTIVE | COMMUNITY
Start: 2024-02-16

## 2024-12-16 RX ORDER — RAMIPRIL 2.5 MG/1
CAPSULE ORAL ONCE A DAY
Refills: 0 | Status: ACTIVE | COMMUNITY
Start: 2022-01-12

## 2024-12-16 RX ORDER — KETOCONAZOLE 20 MG/G
CREAM TOPICAL ONCE A DAY
Refills: 0 | Status: ACTIVE | COMMUNITY
Start: 2022-01-12

## 2024-12-16 RX ORDER — AMLODIPINE BESYLATE 5 MG/1
TABLET ORAL
Refills: 0 | Status: ACTIVE | COMMUNITY
Start: 2010-12-17

## 2024-12-16 RX ORDER — ALBUTEROL SULFATE 2.5 MG/3ML
SOLUTION RESPIRATORY (INHALATION)
Refills: 0 | Status: ACTIVE | COMMUNITY
Start: 2010-12-17

## 2025-01-09 ENCOUNTER — OFFICE VISIT (OUTPATIENT)
Dept: URBAN - METROPOLITAN AREA CLINIC 61 | Facility: CLINIC | Age: 75
End: 2025-01-09
Payer: MEDICARE

## 2025-01-09 ENCOUNTER — CLAIMS CREATED FROM THE CLAIM WINDOW (OUTPATIENT)
Dept: URBAN - METROPOLITAN AREA CLINIC 63 | Facility: CLINIC | Age: 75
End: 2025-01-09
Payer: MEDICARE

## 2025-01-09 DIAGNOSIS — K76.0 FATTY LIVER: ICD-10-CM

## 2025-01-09 PROCEDURE — 76981 USE PARENCHYMA: CPT | Performed by: INTERNAL MEDICINE

## 2025-01-09 RX ORDER — OMEPRAZOLE 20 MG/1
1 CAPSULE 1/2 TO 1 HOUR BEFORE MORNING MEAL CAPSULE, DELAYED RELEASE ORAL ONCE A DAY
Qty: 90 | Status: ACTIVE | COMMUNITY
Start: 2024-11-04

## 2025-01-09 RX ORDER — PREDNISONE 10 MG/1
1 BID FOR 1 WEEK THEN ONE A DAY UNTIL GONE TABLET ORAL
Refills: 0 | Status: ACTIVE | COMMUNITY
Start: 2011-03-30

## 2025-01-09 RX ORDER — ISOSORBIDE MONONITRATE 30 MG/1
TABLET, EXTENDED RELEASE ORAL ONCE A DAY
Refills: 0 | Status: ACTIVE | COMMUNITY
Start: 2022-01-12

## 2025-01-09 RX ORDER — DULAGLUTIDE 0.75 MG/.5ML
INJECTION, SOLUTION SUBCUTANEOUS
Qty: 2 MILLILITER | Refills: 2 | Status: ACTIVE | COMMUNITY

## 2025-01-09 RX ORDER — HYDROCORTISONE 25 MG/G
QD CREAM TOPICAL
Refills: 11 | Status: ACTIVE | COMMUNITY
Start: 2010-03-08

## 2025-01-09 RX ORDER — NITROGLYCERIN 0.4 MG/1
TABLET SUBLINGUAL ONCE A DAY
Refills: 0 | Status: ACTIVE | COMMUNITY
Start: 2022-01-12

## 2025-01-09 RX ORDER — METOPROLOL SUCCINATE 50 MG/1
1 TABLET TABLET, FILM COATED, EXTENDED RELEASE ORAL ONCE A DAY
Refills: 0 | Status: ACTIVE | COMMUNITY
Start: 2022-01-12

## 2025-01-09 RX ORDER — ALBUTEROL SULFATE 0.63 MG/3ML
SOLUTION RESPIRATORY (INHALATION) ONCE A DAY
Refills: 0 | Status: ACTIVE | COMMUNITY
Start: 2022-01-12

## 2025-01-09 RX ORDER — IBUPROFEN 800 MG/1
TABLET, FILM COATED ORAL
Refills: 0 | Status: ACTIVE | COMMUNITY
Start: 2010-12-17

## 2025-01-09 RX ORDER — ROSUVASTATIN CALCIUM 20 MG/1
TABLET, FILM COATED ORAL ONCE A DAY
Refills: 0 | Status: ACTIVE | COMMUNITY
Start: 2022-01-12

## 2025-01-09 RX ORDER — AMLODIPINE BESYLATE 5 MG/1
TABLET ORAL
Refills: 0 | Status: ACTIVE | COMMUNITY
Start: 2010-12-17

## 2025-01-09 RX ORDER — ALBUTEROL SULFATE 2.5 MG/3ML
SOLUTION RESPIRATORY (INHALATION)
Refills: 0 | Status: ACTIVE | COMMUNITY
Start: 2010-12-17

## 2025-01-09 RX ORDER — IPRATROPIUM BROMIDE 0.5 MG/2.5ML
SOLUTION RESPIRATORY (INHALATION) ONCE A DAY
Refills: 0 | Status: ACTIVE | COMMUNITY
Start: 2022-01-12

## 2025-01-09 RX ORDER — PANCRELIPASE LIPASE, PANCRELIPASE PROTEASE, PANCRELIPASE AMYLASE 40000; 126000; 168000 [USP'U]/1; [USP'U]/1; [USP'U]/1
2 CAPSULES WITH MEALS AND 1 WITH SNACKS CAPSULE, DELAYED RELEASE ORAL
Qty: 900 CAPSULES | Refills: 1 | Status: ACTIVE | COMMUNITY
End: 2025-06-03

## 2025-01-09 RX ORDER — TIOTROPIUM BROMIDE 18 UG/1
CAPSULE ORAL; RESPIRATORY (INHALATION) ONCE A DAY
Refills: 0 | Status: ACTIVE | COMMUNITY
Start: 2022-01-12

## 2025-01-09 RX ORDER — FUROSEMIDE 40 MG/1
TABLET ORAL ONCE A DAY
Refills: 0 | Status: ACTIVE | COMMUNITY
Start: 2022-01-12

## 2025-01-09 RX ORDER — RAMIPRIL 2.5 MG/1
CAPSULE ORAL ONCE A DAY
Refills: 0 | Status: ACTIVE | COMMUNITY
Start: 2022-01-12

## 2025-01-09 RX ORDER — DEXLANSOPRAZOLE 60 MG/1
TAKE 1 CAPSULE BY MOUTH EVERY DAY CAPSULE, DELAYED RELEASE ORAL ONCE A DAY
Qty: 30 | Refills: 3 | Status: ACTIVE | COMMUNITY

## 2025-01-09 RX ORDER — OMEPRAZOLE 40 MG/1
1 CAPSULE 30 MINUTES BEFORE MORNING MEAL CAPSULE, DELAYED RELEASE ORAL TWICE A DAY
Qty: 180 | Refills: 1 | Status: ACTIVE | COMMUNITY
Start: 2024-02-16

## 2025-01-09 RX ORDER — KETOCONAZOLE 20 MG/G
CREAM TOPICAL ONCE A DAY
Refills: 0 | Status: ACTIVE | COMMUNITY
Start: 2022-01-12

## 2025-02-04 ENCOUNTER — TELEPHONE ENCOUNTER (OUTPATIENT)
Dept: URBAN - METROPOLITAN AREA CLINIC 63 | Facility: CLINIC | Age: 75
End: 2025-02-04

## 2025-02-11 ENCOUNTER — ERX REFILL RESPONSE (OUTPATIENT)
Dept: URBAN - METROPOLITAN AREA CLINIC 63 | Facility: CLINIC | Age: 75
End: 2025-02-11

## 2025-02-11 RX ORDER — HYDROCORTISONE 25 MG/G
APPLY 1 APPLICATION EXTERNALLY TWICE A DAY 10 DAYS CREAM TOPICAL
Qty: 28 GRAM | Refills: 2 | OUTPATIENT

## 2025-02-11 RX ORDER — HYDROCORTISONE 25 MG/G
APPLY 1 APPLICATION EXTERNALLY TWICE A DAY 10 DAYS CREAM TOPICAL
Qty: 28 GRAM | Refills: 1 | OUTPATIENT

## 2025-03-03 ENCOUNTER — OFFICE VISIT (OUTPATIENT)
Dept: URBAN - METROPOLITAN AREA CLINIC 63 | Facility: CLINIC | Age: 75
End: 2025-03-03
Payer: MEDICARE

## 2025-03-03 VITALS
WEIGHT: 219 LBS | BODY MASS INDEX: 35.2 KG/M2 | TEMPERATURE: 98.7 F | HEART RATE: 78 BPM | OXYGEN SATURATION: 98 % | DIASTOLIC BLOOD PRESSURE: 74 MMHG | HEIGHT: 66 IN | SYSTOLIC BLOOD PRESSURE: 140 MMHG

## 2025-03-03 DIAGNOSIS — K21.00 GASTRO-ESOPHAGEAL REFLUX DISEASE WITH ESOPHAGITIS, WITHOUT BLEEDING: ICD-10-CM

## 2025-03-03 DIAGNOSIS — K55.20 ANGIODYSPLASIA OF SMALL INTESTINE: ICD-10-CM

## 2025-03-03 DIAGNOSIS — D50.9 ANEMIA, IRON DEFICIENCY: ICD-10-CM

## 2025-03-03 DIAGNOSIS — K76.0 FATTY LIVER: ICD-10-CM

## 2025-03-03 PROCEDURE — 99214 OFFICE O/P EST MOD 30 MIN: CPT | Performed by: INTERNAL MEDICINE

## 2025-03-03 RX ORDER — PANCRELIPASE LIPASE, PANCRELIPASE PROTEASE, PANCRELIPASE AMYLASE 40000; 126000; 168000 [USP'U]/1; [USP'U]/1; [USP'U]/1
2 CAPSULES WITH MEALS AND 1 WITH SNACKS CAPSULE, DELAYED RELEASE ORAL
Qty: 900 CAPSULES | Refills: 1 | Status: ACTIVE | COMMUNITY
End: 2025-06-03

## 2025-03-03 RX ORDER — OMEPRAZOLE 20 MG/1
1 CAPSULE 1/2 TO 1 HOUR BEFORE MORNING MEAL CAPSULE, DELAYED RELEASE ORAL ONCE A DAY
Qty: 90 | Status: ACTIVE | COMMUNITY
Start: 2024-11-04

## 2025-03-03 RX ORDER — NITROGLYCERIN 0.4 MG/1
TABLET SUBLINGUAL ONCE A DAY
Refills: 0 | Status: ACTIVE | COMMUNITY
Start: 2022-01-12

## 2025-03-03 RX ORDER — KETOCONAZOLE 20 MG/G
CREAM TOPICAL ONCE A DAY
Refills: 0 | Status: ACTIVE | COMMUNITY
Start: 2022-01-12

## 2025-03-03 RX ORDER — IPRATROPIUM BROMIDE 0.5 MG/2.5ML
SOLUTION RESPIRATORY (INHALATION) ONCE A DAY
Refills: 0 | Status: ACTIVE | COMMUNITY
Start: 2022-01-12

## 2025-03-03 RX ORDER — OMEPRAZOLE 40 MG/1
1 CAPSULE 30 MINUTES BEFORE MORNING MEAL CAPSULE, DELAYED RELEASE ORAL TWICE A DAY
Qty: 180 | Refills: 1 | Status: ACTIVE | COMMUNITY
Start: 2024-02-16

## 2025-03-03 RX ORDER — ROSUVASTATIN CALCIUM 20 MG/1
TABLET, FILM COATED ORAL ONCE A DAY
Refills: 0 | Status: ACTIVE | COMMUNITY
Start: 2022-01-12

## 2025-03-03 RX ORDER — ALBUTEROL SULFATE 0.63 MG/3ML
SOLUTION RESPIRATORY (INHALATION) ONCE A DAY
Refills: 0 | Status: ACTIVE | COMMUNITY
Start: 2022-01-12

## 2025-03-03 RX ORDER — AMLODIPINE BESYLATE 5 MG/1
TABLET ORAL
Refills: 0 | Status: ACTIVE | COMMUNITY
Start: 2010-12-17

## 2025-03-03 RX ORDER — DEXLANSOPRAZOLE 60 MG/1
TAKE 1 CAPSULE BY MOUTH EVERY DAY CAPSULE, DELAYED RELEASE ORAL ONCE A DAY
Qty: 30 | Refills: 3 | Status: ACTIVE | COMMUNITY

## 2025-03-03 RX ORDER — METOPROLOL SUCCINATE 50 MG/1
1 TABLET TABLET, FILM COATED, EXTENDED RELEASE ORAL ONCE A DAY
Refills: 0 | Status: ACTIVE | COMMUNITY
Start: 2022-01-12

## 2025-03-03 RX ORDER — ALBUTEROL SULFATE 2.5 MG/3ML
SOLUTION RESPIRATORY (INHALATION)
Refills: 0 | Status: ACTIVE | COMMUNITY
Start: 2010-12-17

## 2025-03-03 RX ORDER — HYDROCORTISONE 25 MG/G
QD CREAM TOPICAL
Refills: 11 | Status: ACTIVE | COMMUNITY
Start: 2010-03-08

## 2025-03-03 RX ORDER — RAMIPRIL 2.5 MG/1
CAPSULE ORAL ONCE A DAY
Refills: 0 | Status: ACTIVE | COMMUNITY
Start: 2022-01-12

## 2025-03-03 RX ORDER — DULAGLUTIDE 0.75 MG/.5ML
INJECTION, SOLUTION SUBCUTANEOUS
Qty: 2 MILLILITER | Refills: 2 | Status: ACTIVE | COMMUNITY

## 2025-03-03 RX ORDER — FUROSEMIDE 40 MG/1
TABLET ORAL ONCE A DAY
Refills: 0 | Status: ACTIVE | COMMUNITY
Start: 2022-01-12

## 2025-03-03 RX ORDER — IBUPROFEN 800 MG/1
TABLET, FILM COATED ORAL
Refills: 0 | Status: ACTIVE | COMMUNITY
Start: 2010-12-17

## 2025-03-03 RX ORDER — ISOSORBIDE MONONITRATE 30 MG/1
TABLET, EXTENDED RELEASE ORAL ONCE A DAY
Refills: 0 | Status: ACTIVE | COMMUNITY
Start: 2022-01-12

## 2025-03-03 RX ORDER — PREDNISONE 10 MG/1
1 BID FOR 1 WEEK THEN ONE A DAY UNTIL GONE TABLET ORAL
Refills: 0 | Status: ACTIVE | COMMUNITY
Start: 2011-03-30

## 2025-03-03 RX ORDER — HYDROCORTISONE 25 MG/G
APPLY 1 APPLICATION EXTERNALLY TWICE A DAY 10 DAYS CREAM TOPICAL
Qty: 28 GRAM | Refills: 1 | Status: ACTIVE | COMMUNITY

## 2025-03-03 RX ORDER — TIOTROPIUM BROMIDE 18 UG/1
CAPSULE ORAL; RESPIRATORY (INHALATION) ONCE A DAY
Refills: 0 | Status: ACTIVE | COMMUNITY
Start: 2022-01-12

## 2025-03-03 NOTE — HPI-TODAY'S VISIT:
He finished the Prednisone and the LLQ pain has totally resolved. He continues with loose stools about 4-5 times a day. He is also having some pain around the area of the previous hernia repair.  Patient with chronic diarrhea with no evidence of colitis or  celiac disease plan was to start lomotil to control the diarrhea.  Patient had colonoscopy with adenoma polyp removed will need colonoscopy surveillance in 5 years.  Patient had some constipation and cholestyramine was stopped, patient doing well.  Patient comes for surveillance colonoscopy with h/o polyps, with family h/o colon cancer (father, uncles, grandfather)  as per patient request will do at the beginning of .  Colonoscopy showed internal hemorrhoids,  multiple adenomas will repeat colonoscopy in one year.  14: comes for surveillance colonoscopy  will do colonoscopy. patient with reflux and epigastric pain will do EGD to evaluate and r/o gastritis, esophagitis.  Will continue PPI.  : patient had colonoscopy with evidence of adenomas and hyperplastic polyps will need a repeat colonoscopy in 1 years.  Patient with strong family h/o colon cancer.  EGD does showed gastritis negative for H pylori no evidence of Jo's. Discussed the need for appropriate follow-up care.  Patient will be placed in our recall system and a letter will be mailed to the patient.   3/15 Patient is here for his screen colonoscopy, stated has history of multiple polyps and family history of colon cancer, stated has acid reflux, epigastric pain, and slow digestion and nausea. Plan EGD, Colonoscopy, Gastric Emptying study. Zofran, Ranitidine Dexilant. RUQ. 6/15: patient had ultrasound that showed fatty liver and renal cysts, GES is normal EGD and colonoscopy showed  hiatal hernia , gastritis negative for h pylori and colonoscopy showed polyps, hyperplastic and adenoma will repeat colonoscopy in 3 years.  Patient with bloating and abdominal distension. Will start proctitis, will need to do diet. 10/15: Patient with abdominal discomfort with improvement with probiotics, (florastor) but the insurance does not cover the probiotics.  Will try another probiotics and will continue with PPI. : Patient went to [place] with epigastric pain, with reflux will continue PPI and will consider EGD to r/o PUD/ h pylori status. Patient with change on bowel habit  and LLQ pain, with some improvement of the diarrhea and now constipation, will plan colonoscopy.  In the last 3 months patient had another family member his aunt with colon cancer. : Patient had EGD and colonoscopy with evidence of  hiatal hernia gastritis negative for h pylori, colonoscopy with hemorrhoids and polyps adenoma (3 in descending colon) Will do surveillance colonoscopy in 3 years. Will try in 2 years as per patient request.  3/17:  Patient comes with discomfort in his left abdomen and increase in gas, will restart probiotics, will continue with dexilant. Patient with personal h/o colonic polyps with multiples family members with colon cancer, found out recently of 2 more familiars with colon cancer and he is afraid  because of his high risk.  Will plan colonoscopy this summer.  :  Patient persist with bloating will do probiotics, insurance did not cover the treatment, will give sample.  Patient will need colonoscopy and EGD patient with trip to [place] will plan endoscopic studies in 2017.   : patient had EGD and colonoscopy with evidence of hiatal hernia, and gastritis negative for h pylori, colonoscopy showed a 8 mm polyp at the transverse colon and 2 more polyps in the area smaller 5 mm in diameter. Internal hemorrhoids and 3 small polyps at the descending colon. All the polyps are tubular adenoma will repeat colonoscopy in 3 years. Patient with large scar in the mid abdomen with discomfort, and ventral hernia.  :  Patient comes with persistent symptom of discomfort in the abdomen, no evidence of obstruction, with bloating and gas, was treated with probiotics with some improvement. WIll CT scan of abdomen and pelvis to r/o hernias that could be causing partial obstruction. and will start IB diaz, will follow in 2 months. : Patient had CT scan with no pathology that could explain his symptoms. patient with bloating and fullness, with symptoms of slow digestion, and gas. Will start pancreatic enzymes, no improvement with probiotics. Previous GES was normal. Will start trial with creon and will follow in 2 months, If treatment do not improved the symptoms will consider short treatment with antibiotics follow by probiotics.  Patient said he is doing well with Creon once at day after his lunch. Patient is here requiring to have his colonoscopy done, he has medical history of multiples adenoma polyps in the last 3 years, also multiples family members with CRC ( Mainly aunts and uncles ).  Plan: Patient will continue with Creon and also will have surveillance colonoscopy.  Patient said he is doing well with Creon once at day after his lunch. also requesting Dexilant prescription due to reflux and gastritis symptoms at times. Colonoscopy with evidence of one tubular adenoma polyp, and internal hemorrhoids. Patient will do diet and continue with Dexilant and Creon, side effect of PPI were discussed with him. : Patient comes with abdominal pain, will plan EGD and patient with strong family h/o colon cancer wants to have a surveillance colonoscopy.  Will plan EGD and colonoscopy. Patient had a recent trip to [place] and start having dyspepsia, also found out that another cousin had colon cancer at age 60 and she was from her mother side, other were related to his father.  10/21: Patient had in 2020 EGD and colonoscopy with evidence of normal duodenum, gastritis negative for H pylori, distal esophageal reflux negative for Jo's esophagus. Colonoscopy showed tubular adenoma in the transverse colon ( 5 mm) will need repeat colonoscopy in 5 years ad internal hemorrhoids. Patient went to [facility] and since has epigastric pain  and bloating. Will do trial with PPI and will do EGD and will plan colonoscopy. Patient states that other cousin  of colon cancer in [place]. I tried to explain to him the last findings of his colonoscopy, patient states now has constipation and rare rectal bleeding. Will plan EGD and colonoscopy.  : Patient had EGD and colonoscopy with evidence of normal duodenum, gastritis negative for H pylori, fundic gland, distal esophageal reflux, with carditis, negative for Intestinal Metaplasia. Cecal Polyp ( tubular adenoma) and internal hemorrhoids, normal rectal mucosa. Will plan colonoscopy in 3 years. ; Patient comes for evaluation after oncologist who recommended to repeat endoscopic evaluation for drop in HB and with iron deficiency, will do EGD and colonoscopy.  Patient takes occasional NSAID with black stool and  rare rectal bleeding. 10/23: Patient had upper endoscopy and colonoscopy on 2023 with the impression of a normal esophagus, small hiatal hernia, chronic gastritis, normal duodenum.  Colonoscopy showed internal hemorrhoids otherwise normal colon was recommended to repeat a colonoscopy in 5 years because of personal history.  Pathology report had peptic duodenitis, chronic gastritis changes related to proton pump inhibitor, negative for H. pylori, dysplasia or malignancy, distal esophagus which is well mucosa without significant normality, no Jo's esophagus, dysplasia or malignancy.  With this finding we will try to control acid production, with a lower dose of PPI.  We will continue diet and will follow-up as needed basis.Will need to avoid use of NSAID. : Patient is evidence of January of this year had an ultrasound of the abdomen with the impression of interval decrease in size of the bilateral renal cyst, the echogenic liver suggestive of hepatic steatosis and no acute abnormality.  With this finding patient will have a FibroScan, will consider liver function labs.  And lipid panel.  Patient with history of peptic duodenitis and dyspepsia on treatment with diet and medication. Patient needs to do diet and weight loss. I spoke with his PCP , she already order the fibroscan and labs results will be sent to us. Will follow in 4 months. : Patient is being seen today at the office.  With personal history of fatty liver, had a FibroScan with evidence of severe steatosis S3 and no clinical significant fibrosis F0.  Recent labs shows an normal CBC with white blood cell count of 5.7 hemoglobin of 13.4, and platelet count of 269.  PSA was ordered that is in the normal range at 0.46.  Labs show normal INR CMP with normal LFTs, and bilirubin.  Does have an elevated TSH of 6.46 with normal free T4 and total T3.  Normal CBC.  Low vitamin D lipid panel with elevated triglycerides 169 total cholesterol of 283, with elevated  hepatitis A antibody is positive.  This could be related to previous infection or vaccination.  At this moment patient will need to continue doing diet weight loss exercise, will need to control the lipid panel that is abnormal.  With previous history of duodenitis patient need to avoid the use of NSAIDs like ibuprofen and will continue with treatment with PPI will try to wean him of after duodenitis is resolved. Patient had with his oncologist panel of iron and persist low iron, could be related to hs duodenintis, will need IV iron supplement. Will consider further w/u Will increase dose of PPI and will do EGD. and colonoscopy if there are negative will do capsule endocopy. : Patient had upper endoscopy and colonoscopy in May 2024 with impression of the upper endoscopy of normal esophagus, small hiatal hernia, chronic gastritis, and normal duodenum.  Colonoscopy otherwise normal colon and internal hemorrhoids.  Pathology report show no significant normality in the duodenum, chemical reactive gastropathy at the stomach with no evidence of dysplasia or malignancy or intestinal metaplasia.  Distal esophagus with a squamous columnar mucosa with chronic inflammation negative for intestinal metaplasia or dysplasia negative for eosinophils.  With this finding there is no evidence of disease of pathology that could explain the decrease of iron on the patient, there is no evidence of duodenitis, will continue to monitor her his iron and hemoglobin if there is any discrepancy we will consider doing capsule endoscopy for further workup.  As per protocol colonoscopy could be repeated in 5 years.  Will continue with treatment and diet for acid reflux.Will try to decrease PPI to increase his iron absortion in the duodenum. Patient with CT scan with normal pancreas and liver. with left colon apendigitis, and renal cysts. Will follow in 6 months. : Patient  seen after 5 months, with personal h/o iron deficiency anemia. Patient was recent lab in 2024 with elevation of WBC of 12.1, hemoglobin is normal 13.9, platelet count 428,000.  MCV is low 79.5, differential does show mild elevation of neutrophils with normal coagulation time, normal electrolytes, mild elevation of glucose 133, creatinine normal 0.8, normal LFTs with an AST of 25 ALT of 15 total bilirubin of 0.6 alkaline phosphatase of 71.  Also normal lipase of 62 patient had an ultrasound with impression of hepatomegaly and fatty changes.  Prominent gallbladder wall but no stone, may be a calculus cholecystitis.  Patient had this ultrasound at the hospital because of abdominal pain.  Patient was seen at the hospital in August of this year because of chest pain epigastric and sternal pain after doing the initial workup the symptoms resolved and patient was discharged for follow-up as outpatient.  Was recommended to follow-up with her cardiologist patient with personal history of hypertension, right bundle branch block.  Patient with history of iron deficiency but now with evidence of anemia  Hb:12. 8  and low iron 19. Will consider further workup  will do CT scan of abdomen and pelvis, with do the capsule endoscopy with previous small bowel follow through.  Will continue workup of fatty liver we will repeat FibroScan the beginning of the next year patient with severe steatosis and no fibrosis on her last FibroScan done in February of this year.  Will place the order and will follow in 4-month. : Patient is being seen in the office after capsule endoscopy for workup of chronic anemia, iron deficiency.  The findings on the capsule endoscopy show small bowel angiectasia's and gastritis.  Will be recommended to continue treatment for acid control and will consider double-balloon enteroscopy if anemia persist or if there is evidence of acute bleeding (melena).  Patient understand and will continue close follow-up with lab with his primary and hematologist.  Patient in May of last year had upper endoscopy and colonoscopy with evidence of a normal esophagus a small hiatal hernia and chronic gastritis with normal duodenum.  Colonoscopy with a normal colon, and internal hemorrhoids.  There was no evidence of active bleeding at the moment of the procedures.  Will monitor his hemoglobin and will follow closely. Patient went to the [facility] and had gastroenteritis ( diarrhea and nausea, after a soup)  and his PCP indicated antibiotics, now doing better, will do trial with probiotics, with some abdominal discomfort. ptient had a CT scan of the abdomen with evidence of lobulated contour of the liver, fibroscan showed no steatosis and grade of fibrosis is 1, will repeat Fibroscan in 6 months.

## 2025-03-13 ENCOUNTER — FOLLOW UP (OUTPATIENT)
Age: 75
End: 2025-03-13

## 2025-03-13 VITALS — BODY MASS INDEX: 34.55 KG/M2 | WEIGHT: 215 LBS | HEIGHT: 66 IN

## 2025-03-13 DIAGNOSIS — H31.092: ICD-10-CM

## 2025-03-13 DIAGNOSIS — H47.092: ICD-10-CM

## 2025-03-13 DIAGNOSIS — H35.3131: ICD-10-CM

## 2025-03-13 DIAGNOSIS — H43.813: ICD-10-CM

## 2025-03-13 DIAGNOSIS — H04.123: ICD-10-CM

## 2025-03-13 DIAGNOSIS — H02.836: ICD-10-CM

## 2025-03-13 DIAGNOSIS — Z96.1: ICD-10-CM

## 2025-03-13 DIAGNOSIS — H02.833: ICD-10-CM

## 2025-03-13 DIAGNOSIS — D31.31: ICD-10-CM

## 2025-03-13 DIAGNOSIS — H33.102: ICD-10-CM

## 2025-03-13 DIAGNOSIS — H35.372: ICD-10-CM

## 2025-03-13 PROCEDURE — 92250 FUNDUS PHOTOGRAPHY W/I&R: CPT | Mod: 59

## 2025-03-13 PROCEDURE — 92014 COMPRE OPH EXAM EST PT 1/>: CPT

## 2025-03-13 PROCEDURE — 92134 CPTRZ OPH DX IMG PST SGM RTA: CPT

## 2025-05-27 ENCOUNTER — ERX REFILL RESPONSE (OUTPATIENT)
Dept: URBAN - METROPOLITAN AREA CLINIC 63 | Facility: CLINIC | Age: 75
End: 2025-05-27

## 2025-05-27 RX ORDER — OMEPRAZOLE 40 MG/1
TAKE 1 CAPSULE BY MOUTH TWICE A DAY 30 MINUTES BEFORE MORNING MEAL CAPSULE, DELAYED RELEASE ORAL
Qty: 180 CAPSULE | Refills: 2

## 2025-07-22 ENCOUNTER — LAB OUTSIDE AN ENCOUNTER (OUTPATIENT)
Dept: URBAN - METROPOLITAN AREA CLINIC 63 | Facility: CLINIC | Age: 75
End: 2025-07-22

## 2025-07-22 ENCOUNTER — OFFICE VISIT (OUTPATIENT)
Dept: URBAN - METROPOLITAN AREA CLINIC 63 | Facility: CLINIC | Age: 75
End: 2025-07-22
Payer: MEDICARE

## 2025-07-22 DIAGNOSIS — K21.00 GASTRO-ESOPHAGEAL REFLUX DISEASE WITH ESOPHAGITIS, WITHOUT BLEEDING: ICD-10-CM

## 2025-07-22 DIAGNOSIS — D50.9 ANEMIA, IRON DEFICIENCY: ICD-10-CM

## 2025-07-22 DIAGNOSIS — K76.0 FATTY LIVER: ICD-10-CM

## 2025-07-22 DIAGNOSIS — K74.00 LIVER FIBROSIS: ICD-10-CM

## 2025-07-22 PROBLEM — 62484002: Status: ACTIVE | Noted: 2025-07-22

## 2025-07-22 PROCEDURE — 99214 OFFICE O/P EST MOD 30 MIN: CPT

## 2025-07-22 RX ORDER — HYDROCORTISONE 25 MG/G
APPLY 1 APPLICATION EXTERNALLY TWICE A DAY 10 DAYS CREAM TOPICAL
Qty: 28 GRAM | Refills: 1 | Status: ACTIVE | COMMUNITY

## 2025-07-22 RX ORDER — PREDNISONE 10 MG/1
1 BID FOR 1 WEEK THEN ONE A DAY UNTIL GONE TABLET ORAL
Refills: 0 | Status: ACTIVE | COMMUNITY
Start: 2011-03-30

## 2025-07-22 RX ORDER — AMLODIPINE BESYLATE 5 MG/1
TABLET ORAL
Refills: 0 | Status: ACTIVE | COMMUNITY
Start: 2010-12-17

## 2025-07-22 RX ORDER — METOPROLOL SUCCINATE 50 MG/1
1 TABLET TABLET, FILM COATED, EXTENDED RELEASE ORAL ONCE A DAY
Refills: 0 | Status: ACTIVE | COMMUNITY
Start: 2022-01-12

## 2025-07-22 RX ORDER — ALBUTEROL SULFATE 2.5 MG/3ML
SOLUTION RESPIRATORY (INHALATION)
Refills: 0 | Status: ACTIVE | COMMUNITY
Start: 2010-12-17

## 2025-07-22 RX ORDER — ISOSORBIDE MONONITRATE 30 MG/1
TABLET, EXTENDED RELEASE ORAL ONCE A DAY
Refills: 0 | Status: ACTIVE | COMMUNITY
Start: 2022-01-12

## 2025-07-22 RX ORDER — RAMIPRIL 2.5 MG/1
CAPSULE ORAL ONCE A DAY
Refills: 0 | Status: ACTIVE | COMMUNITY
Start: 2022-01-12

## 2025-07-22 RX ORDER — ROSUVASTATIN CALCIUM 20 MG/1
TABLET, FILM COATED ORAL ONCE A DAY
Refills: 0 | Status: ACTIVE | COMMUNITY
Start: 2022-01-12

## 2025-07-22 RX ORDER — DULAGLUTIDE 0.75 MG/.5ML
INJECTION, SOLUTION SUBCUTANEOUS
Qty: 2 MILLILITER | Refills: 2 | Status: ACTIVE | COMMUNITY

## 2025-07-22 RX ORDER — FUROSEMIDE 40 MG/1
TABLET ORAL ONCE A DAY
Refills: 0 | Status: ACTIVE | COMMUNITY
Start: 2022-01-12

## 2025-07-22 RX ORDER — OMEPRAZOLE 40 MG/1
TAKE 1 CAPSULE BY MOUTH TWICE A DAY 30 MINUTES BEFORE MORNING MEAL CAPSULE, DELAYED RELEASE ORAL
Qty: 180 CAPSULE | Refills: 2 | Status: ACTIVE | COMMUNITY

## 2025-07-22 RX ORDER — TIOTROPIUM BROMIDE 18 UG/1
CAPSULE ORAL; RESPIRATORY (INHALATION) ONCE A DAY
Refills: 0 | Status: ACTIVE | COMMUNITY
Start: 2022-01-12

## 2025-07-22 RX ORDER — HYDROCORTISONE 25 MG/G
QD CREAM TOPICAL
Refills: 11 | Status: ACTIVE | COMMUNITY
Start: 2010-03-08

## 2025-07-22 RX ORDER — DEXLANSOPRAZOLE 60 MG/1
TAKE 1 CAPSULE BY MOUTH EVERY DAY CAPSULE, DELAYED RELEASE ORAL ONCE A DAY
Qty: 30 | Refills: 3 | Status: ACTIVE | COMMUNITY

## 2025-07-22 RX ORDER — NITROGLYCERIN 0.4 MG/1
TABLET SUBLINGUAL ONCE A DAY
Refills: 0 | Status: ACTIVE | COMMUNITY
Start: 2022-01-12

## 2025-07-22 RX ORDER — OMEPRAZOLE 20 MG/1
1 CAPSULE 1/2 TO 1 HOUR BEFORE MORNING MEAL CAPSULE, DELAYED RELEASE ORAL ONCE A DAY
Qty: 90 | Status: ACTIVE | COMMUNITY
Start: 2024-11-04

## 2025-07-22 RX ORDER — KETOCONAZOLE 20 MG/G
CREAM TOPICAL ONCE A DAY
Refills: 0 | Status: ACTIVE | COMMUNITY
Start: 2022-01-12

## 2025-07-22 RX ORDER — IPRATROPIUM BROMIDE 0.5 MG/2.5ML
SOLUTION RESPIRATORY (INHALATION) ONCE A DAY
Refills: 0 | Status: ACTIVE | COMMUNITY
Start: 2022-01-12

## 2025-07-22 RX ORDER — ALBUTEROL SULFATE 0.63 MG/3ML
SOLUTION RESPIRATORY (INHALATION) ONCE A DAY
Refills: 0 | Status: ACTIVE | COMMUNITY
Start: 2022-01-12

## 2025-07-22 RX ORDER — IBUPROFEN 800 MG/1
TABLET, FILM COATED ORAL
Refills: 0 | Status: ACTIVE | COMMUNITY
Start: 2010-12-17

## 2025-07-22 NOTE — HPI-TODAY'S VISIT:
He finished the Prednisone and the LLQ pain has totally resolved. He continues with loose stools about 4-5 times a day. He is also having some pain around the area of the previous hernia repair.  Patient with chronic diarrhea with no evidence of colitis or  celiac disease plan was to start lomotil to control the diarrhea.  Patient had colonoscopy with adenoma polyp removed will need colonoscopy surveillance in 5 years.  Patient had some constipation and cholestyramine was stopped, patient doing well.  Patient comes for surveillance colonoscopy with h/o polyps, with family h/o colon cancer (father, uncles, grandfather)  as per patient request will do at the beginning of .  Colonoscopy showed internal hemorrhoids,  multiple adenomas will repeat colonoscopy in one year.  14: comes for surveillance colonoscopy  will do colonoscopy. patient with reflux and epigastric pain will do EGD to evaluate and r/o gastritis, esophagitis.  Will continue PPI.  : patient had colonoscopy with evidence of adenomas and hyperplastic polyps will need a repeat colonoscopy in 1 years.  Patient with strong family h/o colon cancer.  EGD does showed gastritis negative for H pylori no evidence of Jo's. Discussed the need for appropriate follow-up care.  Patient will be placed in our recall system and a letter will be mailed to the patient.   3/15 Patient is here for his screen colonoscopy, stated has history of multiple polyps and family history of colon cancer, stated has acid reflux, epigastric pain, and slow digestion and nausea. Plan EGD, Colonoscopy, Gastric Emptying study. Zofran, Ranitidine Dexilant. RUQ. 6/15: patient had ultrasound that showed fatty liver and renal cysts, GES is normal EGD and colonoscopy showed  hiatal hernia , gastritis negative for h pylori and colonoscopy showed polyps, hyperplastic and adenoma will repeat colonoscopy in 3 years.  Patient with bloating and abdominal distension. Will start proctitis, will need to do diet. 10/15: Patient with abdominal discomfort with improvement with probiotics, (florastor) but the insurance does not cover the probiotics.  Will try another probiotics and will continue with PPI. : Patient went to [place] with epigastric pain, with reflux will continue PPI and will consider EGD to r/o PUD/ h pylori status. Patient with change on bowel habit  and LLQ pain, with some improvement of the diarrhea and now constipation, will plan colonoscopy.  In the last 3 months patient had another family member his aunt with colon cancer. : Patient had EGD and colonoscopy with evidence of  hiatal hernia gastritis negative for h pylori, colonoscopy with hemorrhoids and polyps adenoma (3 in descending colon) Will do surveillance colonoscopy in 3 years. Will try in 2 years as per patient request.  3/17:  Patient comes with discomfort in his left abdomen and increase in gas, will restart probiotics, will continue with dexilant. Patient with personal h/o colonic polyps with multiples family members with colon cancer, found out recently of 2 more familiars with colon cancer and he is afraid  because of his high risk.  Will plan colonoscopy this summer.  :  Patient persist with bloating will do probiotics, insurance did not cover the treatment, will give sample.  Patient will need colonoscopy and EGD patient with trip to [place] will plan endoscopic studies in 2017.   : patient had EGD and colonoscopy with evidence of hiatal hernia, and gastritis negative for h pylori, colonoscopy showed a 8 mm polyp at the transverse colon and 2 more polyps in the area smaller 5 mm in diameter. Internal hemorrhoids and 3 small polyps at the descending colon. All the polyps are tubular adenoma will repeat colonoscopy in 3 years. Patient with large scar in the mid abdomen with discomfort, and ventral hernia.  :  Patient comes with persistent symptom of discomfort in the abdomen, no evidence of obstruction, with bloating and gas, was treated with probiotics with some improvement. WIll CT scan of abdomen and pelvis to r/o hernias that could be causing partial obstruction. and will start IB diaz, will follow in 2 months. : Patient had CT scan with no pathology that could explain his symptoms. patient with bloating and fullness, with symptoms of slow digestion, and gas. Will start pancreatic enzymes, no improvement with probiotics. Previous GES was normal. Will start trial with creon and will follow in 2 months, If treatment do not improved the symptoms will consider short treatment with antibiotics follow by probiotics.  Patient said he is doing well with Creon once at day after his lunch. Patient is here requiring to have his colonoscopy done, he has medical history of multiples adenoma polyps in the last 3 years, also multiples family members with CRC ( Mainly aunts and uncles ).  Plan: Patient will continue with Creon and also will have surveillance colonoscopy.  Patient said he is doing well with Creon once at day after his lunch. also requesting Dexilant prescription due to reflux and gastritis symptoms at times. Colonoscopy with evidence of one tubular adenoma polyp, and internal hemorrhoids. Patient will do diet and continue with Dexilant and Creon, side effect of PPI were discussed with him. : Patient comes with abdominal pain, will plan EGD and patient with strong family h/o colon cancer wants to have a surveillance colonoscopy.  Will plan EGD and colonoscopy. Patient had a recent trip to [place] and start having dyspepsia, also found out that another cousin had colon cancer at age 60 and she was from her mother side, other were related to his father.  10/21: Patient had in 2020 EGD and colonoscopy with evidence of normal duodenum, gastritis negative for H pylori, distal esophageal reflux negative for Jo's esophagus. Colonoscopy showed tubular adenoma in the transverse colon ( 5 mm) will need repeat colonoscopy in 5 years ad internal hemorrhoids. Patient went to [facility] and since has epigastric pain  and bloating. Will do trial with PPI and will do EGD and will plan colonoscopy. Patient states that other cousin  of colon cancer in [place]. I tried to explain to him the last findings of his colonoscopy, patient states now has constipation and rare rectal bleeding. Will plan EGD and colonoscopy.  : Patient had EGD and colonoscopy with evidence of normal duodenum, gastritis negative for H pylori, fundic gland, distal esophageal reflux, with carditis, negative for Intestinal Metaplasia. Cecal Polyp ( tubular adenoma) and internal hemorrhoids, normal rectal mucosa. Will plan colonoscopy in 3 years. ; Patient comes for evaluation after oncologist who recommended to repeat endoscopic evaluation for drop in HB and with iron deficiency, will do EGD and colonoscopy.  Patient takes occasional NSAID with black stool and  rare rectal bleeding. 10/23: Patient had upper endoscopy and colonoscopy on 2023 with the impression of a normal esophagus, small hiatal hernia, chronic gastritis, normal duodenum.  Colonoscopy showed internal hemorrhoids otherwise normal colon was recommended to repeat a colonoscopy in 5 years because of personal history.  Pathology report had peptic duodenitis, chronic gastritis changes related to proton pump inhibitor, negative for H. pylori, dysplasia or malignancy, distal esophagus which is well mucosa without significant normality, no Jo's esophagus, dysplasia or malignancy.  With this finding we will try to control acid production, with a lower dose of PPI.  We will continue diet and will follow-up as needed basis.Will need to avoid use of NSAID. : Patient is evidence of January of this year had an ultrasound of the abdomen with the impression of interval decrease in size of the bilateral renal cyst, the echogenic liver suggestive of hepatic steatosis and no acute abnormality.  With this finding patient will have a FibroScan, will consider liver function labs.  And lipid panel.  Patient with history of peptic duodenitis and dyspepsia on treatment with diet and medication. Patient needs to do diet and weight loss. I spoke with his PCP , she already order the fibroscan and labs results will be sent to us. Will follow in 4 months. : Patient is being seen today at the office.  With personal history of fatty liver, had a FibroScan with evidence of severe steatosis S3 and no clinical significant fibrosis F0.  Recent labs shows an normal CBC with white blood cell count of 5.7 hemoglobin of 13.4, and platelet count of 269.  PSA was ordered that is in the normal range at 0.46.  Labs show normal INR CMP with normal LFTs, and bilirubin.  Does have an elevated TSH of 6.46 with normal free T4 and total T3.  Normal CBC.  Low vitamin D lipid panel with elevated triglycerides 169 total cholesterol of 283, with elevated  hepatitis A antibody is positive.  This could be related to previous infection or vaccination.  At this moment patient will need to continue doing diet weight loss exercise, will need to control the lipid panel that is abnormal.  With previous history of duodenitis patient need to avoid the use of NSAIDs like ibuprofen and will continue with treatment with PPI will try to wean him of after duodenitis is resolved. Patient had with his oncologist panel of iron and persist low iron, could be related to hs duodenintis, will need IV iron supplement. Will consider further w/u Will increase dose of PPI and will do EGD. and colonoscopy if there are negative will do capsule endocopy. : Patient had upper endoscopy and colonoscopy in May 2024 with impression of the upper endoscopy of normal esophagus, small hiatal hernia, chronic gastritis, and normal duodenum.  Colonoscopy otherwise normal colon and internal hemorrhoids.  Pathology report show no significant normality in the duodenum, chemical reactive gastropathy at the stomach with no evidence of dysplasia or malignancy or intestinal metaplasia.  Distal esophagus with a squamous columnar mucosa with chronic inflammation negative for intestinal metaplasia or dysplasia negative for eosinophils.  With this finding there is no evidence of disease of pathology that could explain the decrease of iron on the patient, there is no evidence of duodenitis, will continue to monitor her his iron and hemoglobin if there is any discrepancy we will consider doing capsule endoscopy for further workup.  As per protocol colonoscopy could be repeated in 5 years.  Will continue with treatment and diet for acid reflux.Will try to decrease PPI to increase his iron absortion in the duodenum. Patient with CT scan with normal pancreas and liver. with left colon apendigitis, and renal cysts. Will follow in 6 months. : Patient  seen after 5 months, with personal h/o iron deficiency anemia. Patient was recent lab in 2024 with elevation of WBC of 12.1, hemoglobin is normal 13.9, platelet count 428,000.  MCV is low 79.5, differential does show mild elevation of neutrophils with normal coagulation time, normal electrolytes, mild elevation of glucose 133, creatinine normal 0.8, normal LFTs with an AST of 25 ALT of 15 total bilirubin of 0.6 alkaline phosphatase of 71.  Also normal lipase of 62 patient had an ultrasound with impression of hepatomegaly and fatty changes.  Prominent gallbladder wall but no stone, may be a calculus cholecystitis.  Patient had this ultrasound at the hospital because of abdominal pain.  Patient was seen at the hospital in August of this year because of chest pain epigastric and sternal pain after doing the initial workup the symptoms resolved and patient was discharged for follow-up as outpatient.  Was recommended to follow-up with her cardiologist patient with personal history of hypertension, right bundle branch block.  Patient with history of iron deficiency but now with evidence of anemia  Hb:12. 8  and low iron 19. Will consider further workup  will do CT scan of abdomen and pelvis, with do the capsule endoscopy with previous small bowel follow through.  Will continue workup of fatty liver we will repeat FibroScan the beginning of the next year patient with severe steatosis and no fibrosis on her last FibroScan done in February of this year.  Will place the order and will follow in 4-month. : Patient is being seen in the office after capsule endoscopy for workup of chronic anemia, iron deficiency.  The findings on the capsule endoscopy show small bowel angiectasia's and gastritis.  Will be recommended to continue treatment for acid control and will consider double-balloon enteroscopy if anemia persist or if there is evidence of acute bleeding (melena).  Patient understand and will continue close follow-up with lab with his primary and hematologist.  Patient in May of last year had upper endoscopy and colonoscopy with evidence of a normal esophagus a small hiatal hernia and chronic gastritis with normal duodenum.  Colonoscopy with a normal colon, and internal hemorrhoids.  There was no evidence of active bleeding at the moment of the procedures.  Will monitor his hemoglobin and will follow closely. Patient went to the [facility] and had gastroenteritis ( diarrhea and nausea, after a soup)  and his PCP indicated antibiotics, now doing better, will do trial with probiotics, with some abdominal discomfort. ptient had a CT scan of the abdomen with evidence of lobulated contour of the liver, fibroscan showed no steatosis and grade of fibrosis is 1, will repeat Fibroscan in 6 months.   Patient here today to follow-up fibrosis stage I, Will plan to repeat FibroScan and blood work, Denies any brbpr, melena, abdominal pain, unintentional weight loss, early satiety, fever, chills, diarrhea, constipation, dysphagia, odynophagia, or reflux. Patient reported she had hip surgery next Monday, will plan to follow-up patient in 3 months.

## 2025-08-05 ENCOUNTER — OFFICE VISIT (OUTPATIENT)
Dept: URBAN - METROPOLITAN AREA CLINIC 63 | Facility: CLINIC | Age: 75
End: 2025-08-05